# Patient Record
Sex: MALE | HISPANIC OR LATINO | Employment: FULL TIME | URBAN - METROPOLITAN AREA
[De-identification: names, ages, dates, MRNs, and addresses within clinical notes are randomized per-mention and may not be internally consistent; named-entity substitution may affect disease eponyms.]

---

## 2024-06-15 ENCOUNTER — HOSPITAL ENCOUNTER (OUTPATIENT)
Dept: RADIOLOGY | Facility: MEDICAL CENTER | Age: 59
End: 2024-06-15
Attending: STUDENT IN AN ORGANIZED HEALTH CARE EDUCATION/TRAINING PROGRAM
Payer: COMMERCIAL

## 2024-06-15 ENCOUNTER — HOSPITAL ENCOUNTER (OUTPATIENT)
Dept: LAB | Facility: MEDICAL CENTER | Age: 59
End: 2024-06-15
Attending: STUDENT IN AN ORGANIZED HEALTH CARE EDUCATION/TRAINING PROGRAM
Payer: COMMERCIAL

## 2024-06-15 ENCOUNTER — OFFICE VISIT (OUTPATIENT)
Dept: URGENT CARE | Facility: PHYSICIAN GROUP | Age: 59
End: 2024-06-15
Payer: COMMERCIAL

## 2024-06-15 ENCOUNTER — TELEPHONE (OUTPATIENT)
Dept: URGENT CARE | Facility: PHYSICIAN GROUP | Age: 59
End: 2024-06-15

## 2024-06-15 VITALS
SYSTOLIC BLOOD PRESSURE: 118 MMHG | HEIGHT: 62 IN | BODY MASS INDEX: 17.85 KG/M2 | WEIGHT: 97 LBS | TEMPERATURE: 98.3 F | RESPIRATION RATE: 20 BRPM | HEART RATE: 98 BPM | DIASTOLIC BLOOD PRESSURE: 68 MMHG | OXYGEN SATURATION: 99 %

## 2024-06-15 DIAGNOSIS — M79.672 BILATERAL FOOT PAIN: ICD-10-CM

## 2024-06-15 DIAGNOSIS — M79.671 BILATERAL FOOT PAIN: ICD-10-CM

## 2024-06-15 DIAGNOSIS — M19.071 OSTEOARTHRITIS OF RIGHT FOOT, UNSPECIFIED OSTEOARTHRITIS TYPE: ICD-10-CM

## 2024-06-15 DIAGNOSIS — R20.2 NUMBNESS AND TINGLING OF BOTH FEET: ICD-10-CM

## 2024-06-15 DIAGNOSIS — R20.0 NUMBNESS AND TINGLING OF BOTH FEET: ICD-10-CM

## 2024-06-15 DIAGNOSIS — Z86.39 HISTORY OF DIABETES MELLITUS: ICD-10-CM

## 2024-06-15 DIAGNOSIS — E11.65 UNCONTROLLED TYPE 2 DIABETES MELLITUS WITH HYPERGLYCEMIA (HCC): ICD-10-CM

## 2024-06-15 LAB
ANION GAP SERPL CALC-SCNC: 13 MMOL/L (ref 7–16)
BUN SERPL-MCNC: 10 MG/DL (ref 8–22)
CALCIUM SERPL-MCNC: 9.5 MG/DL (ref 8.5–10.5)
CHLORIDE SERPL-SCNC: 97 MMOL/L (ref 96–112)
CO2 SERPL-SCNC: 24 MMOL/L (ref 20–33)
CREAT SERPL-MCNC: 0.8 MG/DL (ref 0.5–1.4)
EST. AVERAGE GLUCOSE BLD GHB EST-MCNC: 418 MG/DL
GFR SERPLBLD CREATININE-BSD FMLA CKD-EPI: 103 ML/MIN/1.73 M 2
GLUCOSE SERPL-MCNC: 460 MG/DL (ref 65–99)
HBA1C MFR BLD: 16.2 % (ref 4–5.6)
POTASSIUM SERPL-SCNC: 3.9 MMOL/L (ref 3.6–5.5)
SODIUM SERPL-SCNC: 134 MMOL/L (ref 135–145)

## 2024-06-15 PROCEDURE — 3078F DIAST BP <80 MM HG: CPT | Performed by: STUDENT IN AN ORGANIZED HEALTH CARE EDUCATION/TRAINING PROGRAM

## 2024-06-15 PROCEDURE — 99204 OFFICE O/P NEW MOD 45 MIN: CPT | Performed by: STUDENT IN AN ORGANIZED HEALTH CARE EDUCATION/TRAINING PROGRAM

## 2024-06-15 PROCEDURE — 73630 X-RAY EXAM OF FOOT: CPT | Mod: RT

## 2024-06-15 PROCEDURE — 3074F SYST BP LT 130 MM HG: CPT | Performed by: STUDENT IN AN ORGANIZED HEALTH CARE EDUCATION/TRAINING PROGRAM

## 2024-06-15 PROCEDURE — 83036 HEMOGLOBIN GLYCOSYLATED A1C: CPT

## 2024-06-15 PROCEDURE — 73630 X-RAY EXAM OF FOOT: CPT | Mod: LT

## 2024-06-15 PROCEDURE — 36415 COLL VENOUS BLD VENIPUNCTURE: CPT

## 2024-06-15 PROCEDURE — 80048 BASIC METABOLIC PNL TOTAL CA: CPT

## 2024-06-15 NOTE — PROGRESS NOTES
"Subjective:   Singh Malik is a 58 y.o. male who presents for Foot Injury (States injured during work and feels lump on feet. States he was told to come to Urgent care to get seen. States feels dull pressure around both feet and ankles.)      HPI:  58-year-old male presents to the urgent care for approximately 3 months of bilateral foot pain.  States the pain is on the bottom of his forefeet and toes.  Has had pins-and-needles sensation and sharp poking pain intermittently.  Does have a history of type 2 diabetes and used to be on metformin but denies taking this medication.  Was switched to insulin at one point but not taking any insulin at this time.  Not currently on any diabetes medication.  Reviewed past labs.  Last A1c in the system is from 2020 and showed a value of 7.3.  He does have follow-up with his PCP in 4 days.  Denies any trauma or falls.  No redness or swelling to the lower extremities.  Not experiencing any chest pain or shortness of breath.    Medications:    Alcohol Swabs  Blood Glucose Meter Kit  Blood Glucose Test Strips  escitalopram Tabs  gabapentin Caps  Insulin Syringes U-100 0.3 mL  Lancets  metFORMIN Tabs    Allergies: Patient has no known allergies.    Problem List: Snigh Malik does not have any pertinent problems on file.    Surgical History:  No past surgical history on file.    Past Social Hx: Singh Malik  reports that he has never smoked. He has never used smokeless tobacco. He reports that he does not drink alcohol and does not use drugs.     Past Family Hx:  Singh Malik family history is not on file.     Problem list, medications, and allergies reviewed by myself today in Epic.     Objective:     /68 (BP Location: Right arm, Patient Position: Sitting, BP Cuff Size: Adult)   Pulse 98   Temp 36.8 °C (98.3 °F) (Temporal)   Resp 20   Ht 1.57 m (5' 1.81\")   Wt 44 kg (97 lb)   SpO2 99%   BMI 17.85 " kg/m²     Physical Exam  Vitals reviewed.   Constitutional:       General: He is not in acute distress.     Appearance: Normal appearance.   HENT:      Head: Normocephalic.      Right Ear: Tympanic membrane, ear canal and external ear normal.      Left Ear: Tympanic membrane, ear canal and external ear normal.      Nose: Nose normal.      Mouth/Throat:      Mouth: Mucous membranes are moist.   Eyes:      Conjunctiva/sclera: Conjunctivae normal.      Pupils: Pupils are equal, round, and reactive to light.   Cardiovascular:      Rate and Rhythm: Normal rate and regular rhythm.      Pulses: Normal pulses.      Heart sounds: Normal heart sounds. No murmur heard.  Pulmonary:      Effort: Pulmonary effort is normal. No respiratory distress.      Breath sounds: Normal breath sounds. No stridor. No wheezing, rhonchi or rales.   Musculoskeletal:      Cervical back: Normal range of motion.   Feet:      Comments: Bilateral feet: 2+ pedal pulse.  Reduced sensation primarily to the toes with touch.  Full range of motion of the toes and ankle.  No erythema, swelling, or ecchymosis.  Subjective tingling sensation to the bilateral feet.  Lymphadenopathy:      Cervical: No cervical adenopathy.   Neurological:      Mental Status: He is alert.         RADIOLOGY RESULTS   DX-FOOT-COMPLETE 3+ LEFT    Result Date: 6/15/2024  6/15/2024 10:37 AM HISTORY/REASON FOR EXAM:  Atraumatic Pain/Swelling/Deformity; 3 months of numbness and tingling in the feet.  Reports pain under the ball of the foot.  This is bilateral.  Clinically suspect peripheral neuropathy. Left foot pain TECHNIQUE/EXAM DESCRIPTION AND NUMBER OF VIEWS: 3 views of the LEFT foot. COMPARISON:  None. FINDINGS: There is no fracture. Alignment is normal. No degenerative changes are present. There is small vessel atherosclerotic plaque.     1.  No osseous abnormality 2.  Small vessel atherosclerotic plaque    DX-FOOT-COMPLETE 3+ RIGHT    Result Date: 6/15/2024  6/15/2024 10:37 AM  HISTORY/REASON FOR EXAM:  Right foot pain TECHNIQUE/EXAM DESCRIPTION AND NUMBER OF VIEWS: 3 views of the RIGHT foot. COMPARISON:  None. FINDINGS: There is no fracture. Alignment is normal. Midfoot and probably 1st metatarsophalangeal joint degenerative changes are present. There is small vessel atherosclerotic plaque.     1.  Right mid foot and probably 1st metatarsophalangeal joint osteoarthritis 2.  Small vessel atherosclerotic plaque           Assessment/Plan:     Diagnosis and associated orders:     1. Numbness and tingling of both feet  DX-FOOT-COMPLETE 3+ LEFT    DX-FOOT-COMPLETE 3+ RIGHT    HEMOGLOBIN A1C    Basic Metabolic Panel      2. Bilateral foot pain  DX-FOOT-COMPLETE 3+ LEFT    DX-FOOT-COMPLETE 3+ RIGHT      3. Uncontrolled type 2 diabetes mellitus with hyperglycemia (HCC)  HEMOGLOBIN A1C    Basic Metabolic Panel    metFORMIN (GLUCOPHAGE) 500 MG Tab      4. Osteoarthritis of right foot, unspecified osteoarthritis type           Comments/MDM:     Right foot shows arthritic changes and small vessel atherosclerotic plaque.  Left foot shows arthrosclerotic plaque.  No fracture or acute osseous abnormality.  Given patient's description of his symptoms, I have high suspicion for peripheral neuropathy.  Not currently on any diabetes medications and has been diagnosed with type 2 diabetes in the past.  3-month progression.  I will order new A1c and metabolic panel for further evaluation.  Patient vies to follow-up with his PCP on his scheduled appointment in 4 days.  Labs blood glucose level of 460.  A1c 16.2.  Normal GFR and no GOLDEN.  Patient be restarted on metformin at this time.  Advised to follow-up with PCP as he may need additional medications given the significance of his A1c.  Patient was contacted over the phone with his test results.         Differential diagnosis, natural history, supportive care, and indications for immediate follow-up discussed.    Advised the patient to follow-up with the  primary care physician for recheck, reevaluation, and consideration of further management.    Please note that this dictation was created using voice recognition software. I have made a reasonable attempt to correct obvious errors, but I expect that there are errors of grammar and possibly content that I did not discover before finalizing the note.    Electronically signed by Hugo Pop PA-C.

## 2024-06-15 NOTE — ADDENDUM NOTE
Addended by: CHANELL GRAJEDA on: 6/15/2024 04:27 PM     Modules accepted: Orders    
No Vaccines Administered.

## 2024-06-16 NOTE — TELEPHONE ENCOUNTER
Phone Number Called: [phone]       Call outcome: Spoke to patient regarding message below.    Message: Called and spoke to patient sister stated that they have an appt coming up on Monday with primary and that they will  medicine shortly from pharmacy also let her know that patient A1c is high

## 2024-06-18 ENCOUNTER — OFFICE VISIT (OUTPATIENT)
Dept: MEDICAL GROUP | Facility: PHYSICIAN GROUP | Age: 59
End: 2024-06-18
Payer: COMMERCIAL

## 2024-06-18 VITALS
SYSTOLIC BLOOD PRESSURE: 108 MMHG | WEIGHT: 97.3 LBS | DIASTOLIC BLOOD PRESSURE: 60 MMHG | RESPIRATION RATE: 20 BRPM | OXYGEN SATURATION: 99 % | BODY MASS INDEX: 17.9 KG/M2 | HEIGHT: 62 IN | TEMPERATURE: 98.6 F | HEART RATE: 98 BPM

## 2024-06-18 DIAGNOSIS — Z13.228 SCREENING FOR ENDOCRINE, METABOLIC AND IMMUNITY DISORDER: ICD-10-CM

## 2024-06-18 DIAGNOSIS — Z11.3 SCREENING EXAMINATION FOR SEXUALLY TRANSMITTED DISEASE: ICD-10-CM

## 2024-06-18 DIAGNOSIS — R20.2 TINGLING IN EXTREMITIES: ICD-10-CM

## 2024-06-18 DIAGNOSIS — Z13.6 SCREENING FOR CARDIOVASCULAR CONDITION: ICD-10-CM

## 2024-06-18 DIAGNOSIS — R79.89 ABNORMAL LFTS: ICD-10-CM

## 2024-06-18 DIAGNOSIS — F41.9 ANXIETY: ICD-10-CM

## 2024-06-18 DIAGNOSIS — E11.65 TYPE 2 DIABETES MELLITUS WITH HYPERGLYCEMIA, WITHOUT LONG-TERM CURRENT USE OF INSULIN (HCC): ICD-10-CM

## 2024-06-18 DIAGNOSIS — Z13.0 SCREENING FOR ENDOCRINE, METABOLIC AND IMMUNITY DISORDER: ICD-10-CM

## 2024-06-18 DIAGNOSIS — Z13.29 SCREENING FOR ENDOCRINE, METABOLIC AND IMMUNITY DISORDER: ICD-10-CM

## 2024-06-18 DIAGNOSIS — D75.839 THROMBOCYTOSIS: ICD-10-CM

## 2024-06-18 DIAGNOSIS — E55.9 VITAMIN D DEFICIENCY: ICD-10-CM

## 2024-06-18 DIAGNOSIS — Z12.11 COLON CANCER SCREENING: ICD-10-CM

## 2024-06-18 DIAGNOSIS — M79.2 NEUROPATHIC PAIN: ICD-10-CM

## 2024-06-18 PROCEDURE — 99214 OFFICE O/P EST MOD 30 MIN: CPT

## 2024-06-18 PROCEDURE — 3078F DIAST BP <80 MM HG: CPT

## 2024-06-18 PROCEDURE — 3074F SYST BP LT 130 MM HG: CPT

## 2024-06-18 RX ORDER — GLUCOSAMINE HCL/CHONDROITIN SU 500-400 MG
CAPSULE ORAL
Qty: 100 EACH | Refills: 0 | Status: SHIPPED | OUTPATIENT
Start: 2024-06-18

## 2024-06-18 RX ORDER — LANCETS 30 GAUGE
EACH MISCELLANEOUS
Qty: 100 EACH | Refills: 0 | Status: SHIPPED | OUTPATIENT
Start: 2024-06-18

## 2024-06-18 RX ORDER — GABAPENTIN 100 MG/1
100 CAPSULE ORAL 3 TIMES DAILY
Qty: 90 CAPSULE | Refills: 0 | Status: SHIPPED | OUTPATIENT
Start: 2024-06-18

## 2024-06-18 ASSESSMENT — ENCOUNTER SYMPTOMS
COUGH: 0
BLOOD IN STOOL: 0
CHILLS: 0
DIARRHEA: 0
VOMITING: 0
TINGLING: 0
SHORTNESS OF BREATH: 0
PALPITATIONS: 0
HEADACHES: 0
DIZZINESS: 0
WHEEZING: 0
CONSTIPATION: 0
WEIGHT LOSS: 0
ABDOMINAL PAIN: 0
FEVER: 0
NAUSEA: 0

## 2024-06-18 NOTE — PROGRESS NOTES
Subjective:     Chief Complaint   Patient presents with    Establish Care    Medication Refill     Gabapentin     Pain     Vitaly feet pain x 3 month      Interpretor Name: Valentín Maynard Haranny (multiple disconnections due to Internet connection.)  Interpretor ID: [id number]  Patient preferred language used: Yakut    Singh Malik is a 58 y.o. male, who is here today to establish care and discuss pain.     Problem   Anxiety    History of anxiety, patient was on Lexapro 10 mg.  Patient has not been on this medication since 2020.     Type 2 Diabetes Mellitus With Hyperglycemia, Without Long-Term Current Use of Insulin (Hcc)    Current medications:  Metformin 500 mg twice daily.  Patient states that he has not been taking this as he was unable to pick this up in the last week when he was prescribed it.    A1c: 16.2% (6/2024), 7.3% (2020)  Microalb/Cr ratio: none, due  Fasting sugars: Does not check  Diabetic foot exam: none, due  Retinal eye exam: none  ACEi/ARB: none, due for labs  Statin: none, due for labs  Aspirin: N/A  Concomitant HTN: None    *Does note some odd urinary symptoms at this visit stating that it is almost foamy when he urinates.     Neuropathic Pain    Patient complains of 3 months of bilateral foot pain.  States the pain is on the bottom of his forefeet and toes.  Has had pins-and-needles sensation and sharp poking pain intermittently.  Reports he does have a history of type 2 diabetes and was on metformin but denies taking this medication.  Patient was switched to insulin at one point but not taking any insulin at this time.  Was placed on metformin with recent urgent care visit in the last week.    Recent hemoglobin A1c from urgent care showed 16.2%  Has a history of a lumbar x-ray back in 2013 showing spondylolisthesis as well as degenerative disc disease.  NO repeat imaging has been done as of late.  Denies any trauma or falls.  No redness or swelling to the lower  "extremities.  Not experiencing any chest pain or shortness of breath.         Allergies: Patient has no known allergies.    Current Outpatient Medications:     gabapentin (NEURONTIN) 100 MG Cap, Take 1 Capsule by mouth 3 times a day., Disp: 90 Capsule, Rfl: 0    Blood Glucose Meter Kit, Test blood sugar as recommended by provider.  Per insurance blood glucose monitoring kit., Disp: 1 Kit, Rfl: 0    Blood Glucose Test Strips, Use one per insurance strip to test blood sugar once daily early morning before first meal., Disp: 100 Strip, Rfl: 0    Lancets, Use one per insurance lancet to test blood sugar once daily early morning before first meal., Disp: 100 Each, Rfl: 0    Alcohol Swabs, Wipe site with prep pad prior to injection., Disp: 100 Each, Rfl: 0    metFORMIN (GLUCOPHAGE) 500 MG Tab, Take 1 Tablet by mouth 2 times a day with meals. (Patient not taking: Reported on 6/18/2024), Disp: 60 Tablet, Rfl: 0    escitalopram (LEXAPRO) 10 MG Tab, Take 1 Tab by mouth every day. (Patient not taking: Reported on 6/15/2024), Disp: 30 Tab, Rfl: 0    Insulin Syringes U-100 0.3 mL, Use one syringe to inject insulin once daily. (Patient not taking: Reported on 6/15/2024), Disp: 100 Each, Rfl: 0     Review of Systems   Constitutional:  Negative for chills, fever and weight loss.   Respiratory:  Negative for cough, shortness of breath and wheezing.    Cardiovascular:  Negative for chest pain, palpitations and leg swelling.   Gastrointestinal:  Negative for abdominal pain, blood in stool, constipation, diarrhea, nausea and vomiting.   Genitourinary:  Negative for hematuria.   Skin:  Negative for itching and rash.   Neurological:  Negative for dizziness, tingling and headaches.     Health Maintenance: Completed    Objective:     /60 (BP Location: Left arm, Patient Position: Sitting, BP Cuff Size: Adult)   Pulse 98   Temp 37 °C (98.6 °F) (Temporal)   Resp 20   Ht 1.57 m (5' 1.81\")   Wt 44.1 kg (97 lb 4.8 oz)   SpO2 99%  " Body mass index is 17.91 kg/m².    Physical Exam  Vitals reviewed.   Constitutional:       General: He is not in acute distress.     Appearance: Normal appearance. He is not ill-appearing.   Cardiovascular:      Rate and Rhythm: Normal rate and regular rhythm.      Heart sounds: Normal heart sounds.   Pulmonary:      Effort: Pulmonary effort is normal.      Breath sounds: Normal breath sounds.   Abdominal:      General: Abdomen is flat.      Palpations: Abdomen is soft.   Musculoskeletal:         General: Normal range of motion.      Cervical back: Normal range of motion.   Skin:     General: Skin is warm and dry.   Neurological:      General: No focal deficit present.      Mental Status: He is alert.   Psychiatric:         Mood and Affect: Mood normal.         Behavior: Behavior normal.         Thought Content: Thought content normal.         Judgment: Judgment normal.        Assessment & Plan:     The following plan was discussed through shared decision making with the patient:    1. Type 2 diabetes mellitus with hyperglycemia, without long-term current use of insulin (HCC)  Chronic, uncontrolled.  Reviewed patient's dietary intake with focus on reduction or elimination of simple carbohydrates, encouraged patient to increase fiber intake to at least 25 grams per day as tolerated. Aerobic and anaerobic exercise is recommended, aim for 10 minutes daily five days per week and increase to 30 minutes daily as tolerated.   Advised patient to begin taking his metformin 500 mg twice daily and to start checking his morning fasting glucoses.  Patient will record these and follow-up in office in 3 to 4 weeks to go over these glucoses as well as his symptoms on his metformin.    Patient will get updated blood work prior to this follow-up appointment.  Unable to get monofilament due to time in office will complete this at his follow-up visit.  As well as his retina scan.  Patient advised to present to ER for severe fatigue,  confusion, weakness, rapid heard rate.   - Comp Metabolic Panel; Future  - MICROALBUMIN CREAT RATIO URINE; Future  - Blood Glucose Meter Kit; Test blood sugar as recommended by provider.  Per insurance blood glucose monitoring kit.  Dispense: 1 Kit; Refill: 0  - Blood Glucose Test Strips; Use one per insurance strip to test blood sugar once daily early morning before first meal.  Dispense: 100 Strip; Refill: 0  - Lancets; Use one per insurance lancet to test blood sugar once daily early morning before first meal.  Dispense: 100 Each; Refill: 0  - Alcohol Swabs; Wipe site with prep pad prior to injection.  Dispense: 100 Each; Refill: 0    2. Neuropathic pain  3. Tingling in extremities  Chronic, Uncontrolled.  Due to old x-ray in 2013 showing spondylolisthesis we will get an updated image of his lower spine and have patient restart on gabapentin to help with this neuropathy type pain.  Patient will follow-up with me in office in 3 to 4 weeks to discuss this nerve pain.  Educated patient on diabetic complications including neuropathy.  May consider pain referral in the future for this pain.  - DX-LUMBAR SPINE-2 OR 3 VIEWS; Future  - gabapentin (NEURONTIN) 100 MG Cap; Take 1 Capsule by mouth 3 times a day.  Dispense: 90 Capsule; Refill: 0    4. Abnormal LFTs  Of elevated LFTs will get updated blood work  - Comp Metabolic Panel; Future    5. Thrombocytosis  History of thrombocytosis we will get updated blood work  - CBC WITH DIFFERENTIAL; Future    6. Anxiety  History of anxiety, advised patient if this worsens and he would like to restart medication we can discuss this at her next follow-up appointment.  NO symptoms /no concerns in office today    7. Screening for endocrine, metabolic and immunity disorder  Patient establishing care today in office; due for updated lab work/screenings.  Will follow-up in [facility] with lab results.  Discussed with patient that if there is a significant amount of lab abnormalities we will  have a follow-up appointment and I will indicate this is in my follow-up message with lab results.  Patient stated verbal understanding.  - TSH WITH REFLEX TO FT4; Future    8. Screening for cardiovascular condition  See #7  - Lipid Profile; Future    9. Vitamin D deficiency  See #7  - VITAMIN D,25 HYDROXY (DEFICIENCY); Future    10. Screening examination for sexually transmitted disease  See #7  - HIV AG/AB COMBO ASSAY SCREENING; Future    11. Colon cancer screening  See #7  - COLOGUARD (FIT DNA)    Return in about 4 weeks (around 7/16/2024) for Diabetes.         Please note that this dictation was created using voice recognition software. I have made every reasonable attempt to correct obvious errors, but I expect that there are errors of grammar and possibly content that I did not discover before finalizing the note.    MAIA Kyle  [facility]  [facility]

## 2024-06-18 NOTE — LETTER
[facility]  ROMAN Kyle  [address]  [place] [address]  Fax: [phone]   Authorization for Release/Disclosure of   Protected Health Information   Name: SINGH RANDALL : 1965 SSN: [id number]   Address: [address] [place] [address] [place] [address] Phone:    There are no phone numbers on file.   I authorize the entity listed below to release/disclose the PHI below to:   [facility]/ROMAN Kyle and ROMAN Kyle   Provider or Entity Name:     Address   City, State, Zip   Phone:      Fax:     Reason for request: continuity of care   Information to be released:    [  ] LAST COLONOSCOPY,  including any PATH REPORT and follow-up  [  ] LAST FIT/COLOGUARD RESULT [  ] LAST DEXA  [  ] LAST MAMMOGRAM  [  ] LAST PAP  [  ] LAST LABS [  ] RETINA EXAM REPORT  [  ] IMMUNIZATION RECORDS  [  ] Release all info      [  ] Check here and initial the line next to each item to release ALL health information INCLUDING  _____ Care and treatment for drug and / or alcohol abuse  _____ HIV testing, infection status, or AIDS  _____ Genetic Testing    DATES OF SERVICE OR TIME PERIOD TO BE DISCLOSED: _____________  I understand and acknowledge that:  * This Authorization may be revoked at any time by you in writing, except if your health information has already been used or disclosed.  * Your health information that will be used or disclosed as a result of you signing this authorization could be re-disclosed by the recipient. If this occurs, your re-disclosed health information may no longer be protected by State or Federal laws.  * You may refuse to sign this Authorization. Your refusal will not affect your ability to obtain treatment.  * This Authorization becomes effective upon signing and will  on (date) __________.      If no date is indicated, this Authorization will  one (1) year from the signature date.    Name: Singh Mejia  [place]  Signature: Date:   6/18/2024     PLEASE FAX REQUESTED RECORDS BACK TO: [phone]

## 2024-06-29 ENCOUNTER — HOSPITAL ENCOUNTER (OUTPATIENT)
Dept: RADIOLOGY | Facility: MEDICAL CENTER | Age: 59
End: 2024-06-29
Payer: COMMERCIAL

## 2024-06-29 DIAGNOSIS — R20.2 TINGLING IN EXTREMITIES: ICD-10-CM

## 2024-06-29 DIAGNOSIS — M79.2 NEUROPATHIC PAIN: ICD-10-CM

## 2024-06-29 PROCEDURE — 72100 X-RAY EXAM L-S SPINE 2/3 VWS: CPT

## 2024-07-02 DIAGNOSIS — M79.2 NEUROPATHIC PAIN: ICD-10-CM

## 2024-07-02 DIAGNOSIS — M51.36 DEGENERATIVE DISC DISEASE, LUMBAR: ICD-10-CM

## 2024-07-02 DIAGNOSIS — R20.2 TINGLING IN EXTREMITIES: ICD-10-CM

## 2024-07-19 ENCOUNTER — HOSPITAL ENCOUNTER (OUTPATIENT)
Dept: LAB | Facility: MEDICAL CENTER | Age: 59
End: 2024-07-19
Payer: COMMERCIAL

## 2024-07-19 DIAGNOSIS — Z13.0 SCREENING FOR ENDOCRINE, METABOLIC AND IMMUNITY DISORDER: ICD-10-CM

## 2024-07-19 DIAGNOSIS — Z13.29 SCREENING FOR ENDOCRINE, METABOLIC AND IMMUNITY DISORDER: ICD-10-CM

## 2024-07-19 DIAGNOSIS — E11.65 TYPE 2 DIABETES MELLITUS WITH HYPERGLYCEMIA, WITHOUT LONG-TERM CURRENT USE OF INSULIN (HCC): ICD-10-CM

## 2024-07-19 DIAGNOSIS — Z13.6 SCREENING FOR CARDIOVASCULAR CONDITION: ICD-10-CM

## 2024-07-19 DIAGNOSIS — E55.9 VITAMIN D DEFICIENCY: ICD-10-CM

## 2024-07-19 DIAGNOSIS — D75.839 THROMBOCYTOSIS: ICD-10-CM

## 2024-07-19 DIAGNOSIS — Z13.228 SCREENING FOR ENDOCRINE, METABOLIC AND IMMUNITY DISORDER: ICD-10-CM

## 2024-07-19 DIAGNOSIS — Z11.3 SCREENING EXAMINATION FOR SEXUALLY TRANSMITTED DISEASE: ICD-10-CM

## 2024-07-19 DIAGNOSIS — R79.89 ABNORMAL LFTS: ICD-10-CM

## 2024-07-19 LAB
25(OH)D3 SERPL-MCNC: 51 NG/ML (ref 30–100)
ALBUMIN SERPL BCP-MCNC: 4.1 G/DL (ref 3.2–4.9)
ALBUMIN/GLOB SERPL: 1.5 G/DL
ALP SERPL-CCNC: 99 U/L (ref 30–99)
ALT SERPL-CCNC: 13 U/L (ref 2–50)
ANION GAP SERPL CALC-SCNC: 14 MMOL/L (ref 7–16)
AST SERPL-CCNC: 16 U/L (ref 12–45)
BASOPHILS # BLD AUTO: 0.9 % (ref 0–1.8)
BASOPHILS # BLD: 0.07 K/UL (ref 0–0.12)
BILIRUB SERPL-MCNC: 0.4 MG/DL (ref 0.1–1.5)
BUN SERPL-MCNC: 8 MG/DL (ref 8–22)
CALCIUM ALBUM COR SERPL-MCNC: 9.5 MG/DL (ref 8.5–10.5)
CALCIUM SERPL-MCNC: 9.6 MG/DL (ref 8.5–10.5)
CHLORIDE SERPL-SCNC: 98 MMOL/L (ref 96–112)
CHOLEST SERPL-MCNC: 186 MG/DL (ref 100–199)
CO2 SERPL-SCNC: 25 MMOL/L (ref 20–33)
CREAT SERPL-MCNC: 0.66 MG/DL (ref 0.5–1.4)
CREAT UR-MCNC: 64.27 MG/DL
EOSINOPHIL # BLD AUTO: 0.23 K/UL (ref 0–0.51)
EOSINOPHIL NFR BLD: 2.8 % (ref 0–6.9)
ERYTHROCYTE [DISTWIDTH] IN BLOOD BY AUTOMATED COUNT: 39 FL (ref 35.9–50)
GFR SERPLBLD CREATININE-BSD FMLA CKD-EPI: 110 ML/MIN/1.73 M 2
GLOBULIN SER CALC-MCNC: 2.8 G/DL (ref 1.9–3.5)
GLUCOSE SERPL-MCNC: 237 MG/DL (ref 65–99)
HCT VFR BLD AUTO: 43.9 % (ref 42–52)
HDLC SERPL-MCNC: 48 MG/DL
HGB BLD-MCNC: 15.8 G/DL (ref 14–18)
HIV 1+2 AB+HIV1 P24 AG SERPL QL IA: NORMAL
IMM GRANULOCYTES # BLD AUTO: 0.02 K/UL (ref 0–0.11)
IMM GRANULOCYTES NFR BLD AUTO: 0.2 % (ref 0–0.9)
LDLC SERPL CALC-MCNC: 116 MG/DL
LYMPHOCYTES # BLD AUTO: 2.41 K/UL (ref 1–4.8)
LYMPHOCYTES NFR BLD: 29.4 % (ref 22–41)
MCH RBC QN AUTO: 31.5 PG (ref 27–33)
MCHC RBC AUTO-ENTMCNC: 36 G/DL (ref 32.3–36.5)
MCV RBC AUTO: 87.5 FL (ref 81.4–97.8)
MICROALBUMIN UR-MCNC: <1.2 MG/DL
MICROALBUMIN/CREAT UR: NORMAL MG/G (ref 0–30)
MONOCYTES # BLD AUTO: 0.74 K/UL (ref 0–0.85)
MONOCYTES NFR BLD AUTO: 9 % (ref 0–13.4)
NEUTROPHILS # BLD AUTO: 4.73 K/UL (ref 1.82–7.42)
NEUTROPHILS NFR BLD: 57.7 % (ref 44–72)
NRBC # BLD AUTO: 0 K/UL
NRBC BLD-RTO: 0 /100 WBC (ref 0–0.2)
PLATELET # BLD AUTO: 315 K/UL (ref 164–446)
PMV BLD AUTO: 8.9 FL (ref 9–12.9)
POTASSIUM SERPL-SCNC: 4.3 MMOL/L (ref 3.6–5.5)
PROT SERPL-MCNC: 6.9 G/DL (ref 6–8.2)
RBC # BLD AUTO: 5.02 M/UL (ref 4.7–6.1)
SODIUM SERPL-SCNC: 137 MMOL/L (ref 135–145)
TRIGL SERPL-MCNC: 108 MG/DL (ref 0–149)
TSH SERPL DL<=0.005 MIU/L-ACNC: 3.59 UIU/ML (ref 0.38–5.33)
WBC # BLD AUTO: 8.2 K/UL (ref 4.8–10.8)

## 2024-07-19 PROCEDURE — 82043 UR ALBUMIN QUANTITATIVE: CPT

## 2024-07-19 PROCEDURE — 80053 COMPREHEN METABOLIC PANEL: CPT

## 2024-07-19 PROCEDURE — 36415 COLL VENOUS BLD VENIPUNCTURE: CPT

## 2024-07-19 PROCEDURE — 84443 ASSAY THYROID STIM HORMONE: CPT

## 2024-07-19 PROCEDURE — 85025 COMPLETE CBC W/AUTO DIFF WBC: CPT

## 2024-07-19 PROCEDURE — 87389 HIV-1 AG W/HIV-1&-2 AB AG IA: CPT

## 2024-07-19 PROCEDURE — 82306 VITAMIN D 25 HYDROXY: CPT

## 2024-07-19 PROCEDURE — 80061 LIPID PANEL: CPT

## 2024-07-19 PROCEDURE — 82570 ASSAY OF URINE CREATININE: CPT

## 2024-07-23 ENCOUNTER — APPOINTMENT (OUTPATIENT)
Dept: MEDICAL GROUP | Facility: PHYSICIAN GROUP | Age: 59
End: 2024-07-23
Payer: COMMERCIAL

## 2024-07-26 ENCOUNTER — APPOINTMENT (OUTPATIENT)
Dept: MEDICAL GROUP | Facility: PHYSICIAN GROUP | Age: 59
End: 2024-07-26
Payer: COMMERCIAL

## 2024-07-26 DIAGNOSIS — E11.65 UNCONTROLLED TYPE 2 DIABETES MELLITUS WITH HYPERGLYCEMIA (HCC): ICD-10-CM

## 2024-07-26 DIAGNOSIS — M79.2 NEUROPATHIC PAIN: ICD-10-CM

## 2024-07-26 DIAGNOSIS — R20.2 TINGLING IN EXTREMITIES: ICD-10-CM

## 2024-07-29 RX ORDER — GABAPENTIN 100 MG/1
100 CAPSULE ORAL 3 TIMES DAILY
Qty: 90 CAPSULE | Refills: 0 | Status: SHIPPED | OUTPATIENT
Start: 2024-07-29

## 2024-08-06 ENCOUNTER — OFFICE VISIT (OUTPATIENT)
Dept: MEDICAL GROUP | Facility: PHYSICIAN GROUP | Age: 59
End: 2024-08-06
Payer: COMMERCIAL

## 2024-08-06 VITALS
BODY MASS INDEX: 16.93 KG/M2 | TEMPERATURE: 98.2 F | HEIGHT: 62 IN | DIASTOLIC BLOOD PRESSURE: 68 MMHG | SYSTOLIC BLOOD PRESSURE: 110 MMHG | WEIGHT: 92 LBS | RESPIRATION RATE: 12 BRPM | HEART RATE: 113 BPM | OXYGEN SATURATION: 98 %

## 2024-08-06 DIAGNOSIS — M79.2 NEUROPATHIC PAIN: ICD-10-CM

## 2024-08-06 DIAGNOSIS — Z02.89 ENCOUNTER FOR COMPLETION OF FORM WITH PATIENT: ICD-10-CM

## 2024-08-06 DIAGNOSIS — M43.16 ANTEROLISTHESIS OF LUMBAR SPINE: ICD-10-CM

## 2024-08-06 DIAGNOSIS — M51.36 DEGENERATIVE DISC DISEASE, LUMBAR: ICD-10-CM

## 2024-08-06 DIAGNOSIS — R20.2 TINGLING IN EXTREMITIES: ICD-10-CM

## 2024-08-06 DIAGNOSIS — E11.65 TYPE 2 DIABETES MELLITUS WITH HYPERGLYCEMIA, WITHOUT LONG-TERM CURRENT USE OF INSULIN (HCC): ICD-10-CM

## 2024-08-06 PROBLEM — M51.369 DEGENERATIVE DISC DISEASE, LUMBAR: Status: ACTIVE | Noted: 2024-08-06

## 2024-08-06 PROCEDURE — 99214 OFFICE O/P EST MOD 30 MIN: CPT

## 2024-08-06 PROCEDURE — 3078F DIAST BP <80 MM HG: CPT

## 2024-08-06 PROCEDURE — 3074F SYST BP LT 130 MM HG: CPT

## 2024-08-06 RX ORDER — MELOXICAM 15 MG/1
15 TABLET ORAL DAILY
Qty: 30 TABLET | Refills: 1 | Status: SHIPPED | OUTPATIENT
Start: 2024-08-06

## 2024-08-06 RX ORDER — GABAPENTIN 100 MG/1
200 CAPSULE ORAL 3 TIMES DAILY
Qty: 180 CAPSULE | Refills: 0 | Status: SHIPPED
Start: 2024-08-06 | End: 2024-08-20

## 2024-08-06 ASSESSMENT — FIBROSIS 4 INDEX: FIB4 SCORE: 0.79

## 2024-08-06 NOTE — LETTER
August 6, 2024    To Whom It May Concern:         This is confirmation that Singh Malik attended his scheduled appointment with ROMAN Kyle on 8/06/24.     Patient is currently under my care and managing medical issues. Please allow this patient to take a total of 15 days to rest and attend doctor's visits. I am sending him to specialty care for further evaluation as well.          If you have any questions please do not hesitate to call me at the phone number listed below.    Sincerely,          PRASHANT Kyle.  [phone]

## 2024-08-06 NOTE — PROGRESS NOTES
Subjective:     Chief Complaint   Patient presents with    Lab Results    Foot Problem     Wants a disability form   Has arthritis and pain in both of his feet   Left big toe has the most pain     Diabetes    Leg Pain     Leg pain in both legs.    Interpretor Name: Megan  Interpretor ID: [id number]  Patient preferred language used: Japanese    HPI: Singh presents today with  Problem   Degenerative Disc Disease, Lumbar   Anterolisthesis of Lumbar Spine   Type 2 Diabetes Mellitus With Hyperglycemia, Without Long-Term Current Use of Insulin (Hcc)    Current medications:  Metformin 500 mg twice daily.      A1c: 16.2% (6/2024), 7.3% (2020)  Microalb/Cr ratio: none, due  Fasting sugars: Does not check  Diabetic foot exam: none, due  Retinal eye exam: none  ACEi/ARB: none, due for labs  Statin: none, due for labs  Aspirin: N/A  Concomitant HTN: None    *Does note some odd urinary symptoms at this visit stating that it is almost foamy when he urinates.     Neuropathic Pain    Patient is today for follow-up of neuropathic pain which he has been complaining for worsening of bilateral foot pain over the last 3 to 5 months.   States the pain is on the bottom of his forefeet and toes.  Has had pins-and-needles sensation and sharp poking pain intermittently.  Reports he does have a history of type 2 diabetes and was on metformin but denies taking this medication.  Patient was switched to insulin at one point but not taking any insulin at this time.  Was placed on metformin with recent urgent care visit in the last week.    Recent hemoglobin A1c from urgent care showed 16.2%  Recent lumbar x-ray (6/2024) revealed degenerative disc disease as well as anterolisthesis of the lumbar spine.  Denies any trauma or falls.  No redness or swelling to the lower extremities.      Allergies: Patient has no known allergies.  ROS per HPI  Health Maintenance: Completed   Objective:     /68 (BP Location: Left arm, Patient Position: Sitting, BP  "Cuff Size: Small adult)   Pulse (!) 113   Temp 36.8 °C (98.2 °F) (Temporal)   Resp 12   Ht 1.57 m (5' 1.81\")   Wt 41.7 kg (92 lb)   SpO2 98%   BMI 16.93 kg/m²  Body mass index is 16.93 kg/m².     Physical Exam  Vitals reviewed.   Constitutional:       General: He is not in acute distress.     Appearance: Normal appearance. He is not ill-appearing.   Cardiovascular:      Rate and Rhythm: Normal rate and regular rhythm.   Pulmonary:      Effort: Pulmonary effort is normal. No respiratory distress.   Musculoskeletal:      Right foot: Normal range of motion. No tenderness or bony tenderness.      Left foot: Normal range of motion. No tenderness or bony tenderness.      Comments: Decreased sensation to his feet bilaterally   Skin:     Coloration: Skin is not jaundiced or pale.   Neurological:      General: No focal deficit present.      Mental Status: He is alert and oriented to person, place, and time.   Psychiatric:         Mood and Affect: Mood normal.         Behavior: Behavior normal.         Thought Content: Thought content normal.         Judgment: Judgment normal.       Results for orders placed or performed during the hospital encounter of 07/19/24   MICROALBUMIN CREAT RATIO URINE   Result Value Ref Range    Creatinine, Urine 64.27 mg/dL    Microalbumin, Urine Random <1.2 mg/dL    Micro Alb Creat Ratio see below 0 - 30 mg/g   Comp Metabolic Panel   Result Value Ref Range    Sodium 137 135 - 145 mmol/L    Potassium 4.3 3.6 - 5.5 mmol/L    Chloride 98 96 - 112 mmol/L    Co2 25 20 - 33 mmol/L    Anion Gap 14.0 7.0 - 16.0    Glucose 237 (H) 65 - 99 mg/dL    Bun 8 8 - 22 mg/dL    Creatinine 0.66 0.50 - 1.40 mg/dL    Calcium 9.6 8.5 - 10.5 mg/dL    Correct Calcium 9.5 8.5 - 10.5 mg/dL    AST(SGOT) 16 12 - 45 U/L    ALT(SGPT) 13 2 - 50 U/L    Alkaline Phosphatase 99 30 - 99 U/L    Total Bilirubin 0.4 0.1 - 1.5 mg/dL    Albumin 4.1 3.2 - 4.9 g/dL    Total Protein 6.9 6.0 - 8.2 g/dL    Globulin 2.8 1.9 - 3.5 g/dL "    A-G Ratio 1.5 g/dL   HIV AG/AB COMBO ASSAY SCREENING   Result Value Ref Range    HIV Ag/Ab Combo Assay Non-Reactive Non Reactive   TSH WITH REFLEX TO FT4   Result Value Ref Range    TSH 3.590 0.380 - 5.330 uIU/mL   VITAMIN D,25 HYDROXY (DEFICIENCY)   Result Value Ref Range    25-Hydroxy   Vitamin D 25 51 30 - 100 ng/mL   Lipid Profile   Result Value Ref Range    Cholesterol,Tot 186 100 - 199 mg/dL    Triglycerides 108 0 - 149 mg/dL    HDL 48 >=40 mg/dL     (H) <100 mg/dL   CBC WITH DIFFERENTIAL   Result Value Ref Range    WBC 8.2 4.8 - 10.8 K/uL    RBC 5.02 4.70 - 6.10 M/uL    Hemoglobin 15.8 14.0 - 18.0 g/dL    Hematocrit 43.9 42.0 - 52.0 %    MCV 87.5 81.4 - 97.8 fL    MCH 31.5 27.0 - 33.0 pg    MCHC 36.0 32.3 - 36.5 g/dL    RDW 39.0 35.9 - 50.0 fL    Platelet Count 315 164 - 446 K/uL    MPV 8.9 (L) 9.0 - 12.9 fL    Neutrophils-Polys 57.70 44.00 - 72.00 %    Lymphocytes 29.40 22.00 - 41.00 %    Monocytes 9.00 0.00 - 13.40 %    Eosinophils 2.80 0.00 - 6.90 %    Basophils 0.90 0.00 - 1.80 %    Immature Granulocytes 0.20 0.00 - 0.90 %    Nucleated RBC 0.00 0.00 - 0.20 /100 WBC    Neutrophils (Absolute) 4.73 1.82 - 7.42 K/uL    Lymphs (Absolute) 2.41 1.00 - 4.80 K/uL    Monos (Absolute) 0.74 0.00 - 0.85 K/uL    Eos (Absolute) 0.23 0.00 - 0.51 K/uL    Baso (Absolute) 0.07 0.00 - 0.12 K/uL    Immature Granulocytes (abs) 0.02 0.00 - 0.11 K/uL    NRBC (Absolute) 0.00 K/uL   ESTIMATED GFR   Result Value Ref Range    GFR (CKD-EPI) 110 >60 mL/min/1.73 m 2      Assessment and Plan:     The following treatment plan was discussed through shared decision making with the patient:    1. Type 2 diabetes mellitus with hyperglycemia, without long-term current use of insulin (HCC)  Chronic, uncontrolled.  Patient's blood sugar log from home that he brought in office today still significantly elevated.  We will increase his metformin to 1000 mg twice daily and follow-up in the little over a month to get an A1c in office as  well as other diabetic annual screenings.  - metformin (GLUCOPHAGE) 1000 MG tablet; Take 1 Tablet by mouth 2 times a day with meals.  Dispense: 60 Tablet; Refill: 2    2. Degenerative disc disease, lumbar  3. Anterolisthesis of lumbar spine  Chronic, uncontrolled.  Patient will begin taking meloxicam 15 mg daily to help with pain relief.  Discussed that patient should not take this with any other NSAID therapy and to take this with food.  Referral was previously placed after x-ray was completed for pain management.  Provided phone number to call and schedule this appointment to further evaluate with physiatry/pain management.  Advised patient he may also take Tylenol and to not exceed 3 g daily.  - meloxicam (MOBIC) 15 MG tablet; Take 1 Tablet by mouth every day.  Dispense: 30 Tablet; Refill: 1    4. Neuropathic pain  5. Tingling in extremities  Chronic, uncontrolled.  Patient did notice an improvement while on gabapentin however still struggling with some breakthrough neuropathy.  We will dose increase his gabapentin to 200 mg 3 times daily.  Patient will follow-up with physiatry/pain management for further management of his neuropathy and degenerative disc disease  - gabapentin (NEURONTIN) 100 MG Cap; Take 2 Capsules by mouth 3 times a day for 30 days.  Dispense: 180 Capsule; Refill: 0    6. Encounter for completion of form with patient  Letter completed for patient.  Discussed FMLA paperwork with him and his family member for further care as this will likely not be resolved within the 15 days he was requesting today in office.  Patient stated verbal understanding    Return in about 6 weeks (around 9/17/2024) for DM w/ A1C.         Please note that this note was created using dictation with voice recognition software. I have made every reasonable attempt to correct obvious errors, but I expect that there are errors of grammar and possibly content that I did not discover before finalizing the note.    Maria T CLARK  MAIA Lay  [facility]  [facility]

## 2024-08-07 ENCOUNTER — TELEPHONE (OUTPATIENT)
Dept: HEALTH INFORMATION MANAGEMENT | Facility: OTHER | Age: 59
End: 2024-08-07
Payer: COMMERCIAL

## 2024-08-13 ENCOUNTER — TELEPHONE (OUTPATIENT)
Dept: HEALTH INFORMATION MANAGEMENT | Facility: OTHER | Age: 59
End: 2024-08-13
Payer: COMMERCIAL

## 2024-08-13 DIAGNOSIS — E11.65 TYPE 2 DIABETES MELLITUS WITH HYPERGLYCEMIA, WITHOUT LONG-TERM CURRENT USE OF INSULIN (HCC): ICD-10-CM

## 2024-08-20 ENCOUNTER — OFFICE VISIT (OUTPATIENT)
Dept: PHYSICAL MEDICINE AND REHAB | Facility: MEDICAL CENTER | Age: 59
End: 2024-08-20
Payer: COMMERCIAL

## 2024-08-20 VITALS
TEMPERATURE: 99.1 F | SYSTOLIC BLOOD PRESSURE: 142 MMHG | HEIGHT: 62 IN | DIASTOLIC BLOOD PRESSURE: 84 MMHG | HEART RATE: 101 BPM | WEIGHT: 96.12 LBS | BODY MASS INDEX: 17.69 KG/M2 | OXYGEN SATURATION: 98 %

## 2024-08-20 DIAGNOSIS — R20.2 NUMBNESS AND TINGLING OF BOTH LEGS: ICD-10-CM

## 2024-08-20 DIAGNOSIS — E11.65 TYPE 2 DIABETES MELLITUS WITH HYPERGLYCEMIA, WITHOUT LONG-TERM CURRENT USE OF INSULIN (HCC): ICD-10-CM

## 2024-08-20 DIAGNOSIS — G89.29 OTHER CHRONIC PAIN: ICD-10-CM

## 2024-08-20 DIAGNOSIS — M43.17 ANTEROLISTHESIS OF LUMBOSACRAL SPINE: ICD-10-CM

## 2024-08-20 DIAGNOSIS — M79.2 NEUROPATHIC PAIN: ICD-10-CM

## 2024-08-20 DIAGNOSIS — R20.0 NUMBNESS AND TINGLING OF BOTH LEGS: ICD-10-CM

## 2024-08-20 DIAGNOSIS — Z13.31 POSITIVE DEPRESSION SCREENING: ICD-10-CM

## 2024-08-20 PROCEDURE — 3079F DIAST BP 80-89 MM HG: CPT | Performed by: STUDENT IN AN ORGANIZED HEALTH CARE EDUCATION/TRAINING PROGRAM

## 2024-08-20 PROCEDURE — 1125F AMNT PAIN NOTED PAIN PRSNT: CPT | Performed by: STUDENT IN AN ORGANIZED HEALTH CARE EDUCATION/TRAINING PROGRAM

## 2024-08-20 PROCEDURE — 99204 OFFICE O/P NEW MOD 45 MIN: CPT | Performed by: STUDENT IN AN ORGANIZED HEALTH CARE EDUCATION/TRAINING PROGRAM

## 2024-08-20 PROCEDURE — 3077F SYST BP >= 140 MM HG: CPT | Performed by: STUDENT IN AN ORGANIZED HEALTH CARE EDUCATION/TRAINING PROGRAM

## 2024-08-20 RX ORDER — GABAPENTIN 300 MG/1
300 CAPSULE ORAL 3 TIMES DAILY
Qty: 90 CAPSULE | Refills: 2 | Status: SHIPPED | OUTPATIENT
Start: 2024-08-20

## 2024-08-20 ASSESSMENT — PATIENT HEALTH QUESTIONNAIRE - PHQ9
CLINICAL INTERPRETATION OF PHQ2 SCORE: 4
SUM OF ALL RESPONSES TO PHQ QUESTIONS 1-9: 12
5. POOR APPETITE OR OVEREATING: 0 - NOT AT ALL

## 2024-08-20 ASSESSMENT — PAIN SCALES - GENERAL: PAINLEVEL: 8=MODERATE-SEVERE PAIN

## 2024-08-20 ASSESSMENT — FIBROSIS 4 INDEX: FIB4 SCORE: 0.79

## 2024-08-20 NOTE — LETTER
August 20, 2024    To Whom It May Concern:         This is confirmation that Singh Malik attended his scheduled appointment with Rosa Ronquillo M.D. on 8/20/24. It is my professional medical opinion that he remain out of work until 9/3//24.         If you have any questions please do not hesitate to call me at the phone number listed below.    Sincerely,          Rosa Ronquillo M.D.  [phone]

## 2024-08-20 NOTE — Clinical Note
I referred this patient to you for an EMG.  Peripheral polyneuropathy vs lumbar radic screen. He has severe neuropathic pain in his bilateral lower extremities primarily in a stocking glove distribution.  I believe it is likely from diabetes given his A1c of 16.2% on 6/15/2024, however lumbar radiculopathy is also in the differential.

## 2024-08-20 NOTE — PROGRESS NOTES
services were used in the patient's primary language of [place]bourgish.     Name or Number: Jarett  Mode of interpretation: iPad    Content of Interpretation:      New Patient Note    Interventional Pain and Spine  Physiatry (Physical Medicine and Rehabilitation)     Patient Name: Singh Malik  : 1965  Date of Service: 2024  PCP: ROMAN Kyle  Referring Provider: Maria T Lay A.P.R.*    Chief Complaint:   Chief Complaint   Patient presents with    New Patient     Tingling in extremities       HPI  HISTORY FROM INITIAL VISIT (2024):    History of Present Illness  Singh Malik is a 58-year-old male who presents for a new patient visit. The visit was conducted in [place]ish with the assistance of an . He is accompanied by his sister Aleyda.    He reports experiencing numbness, tingling, and pain in his legs. Initially, he was diagnosed with metatarsalgia, followed by a diagnosis of diabetic neuropathy due to nerve damage. The most significant issue is numbness in both big toes. The pain fluctuates, becoming so severe at times that it impairs his ability to walk. He also experiences a sensation akin to electric shocks, particularly in his left foot. Despite trying various medications, including ibuprofen and acetaminophen, the pain persists. Gabapentin has provided some relief, reducing the pins-and-needles sensation by about 15 percent. He reports no side effects from the gabapentin.    The pain is most intense in his calf, but he does not report any leg weakness or shooting pain down his leg. The symptoms began approximately 5 to 6 months ago. His job involves standing for 12 hours a day, which causes discomfort in his feet and ankles. He initially attributed the pain to this prolonged standing. He reports no back pain. The pain is most noticeable upon waking after a long sleep.    He is currently taking metformin 2000 mg  twice daily. He requests a work excuse note for two weeks due to his discomfort and inability to work.    Psychological testing for pain as depression and pain commonly coexist and need to both be treated.     Opioid Risk Score: 0      Interpretation of Opioid Risk Score   Score 0-3 = Low risk of abuse. Do UDS at least once per year.  Score 4-7 = Moderate risk of abuse. Do UDS 1-4 times per year.  Score 8+ = High risk of abuse. Refer to specialist.    PHQ      12/8/2020     9:53 PM 8/20/2024     8:00 AM   Depression Screen (PHQ-2/PHQ-9)   PHQ-2 Total Score 0    PHQ-2 Total Score  4   PHQ-9 Total Score  12       Interpretation of PHQ-9 Total Score   Score Severity   1-4 No Depression   5-9 Mild Depression   10-14 Moderate Depression   15-19 Moderately Severe Depression   20-27 Severe Depression      Medical records review:  I reviewed the note from the referring provider Maria T Lay A.P.RSrinivasan*  ROS:   Red Flags ROS:   Fever, Chills, Sweats: Denies  Involuntary Weight Loss: Denies  Bladder Incontinence: Denies  Bowel Incontinence: denies  Saddle Anesthesia: Denies    All other systems reviewed and negative.     PMHx:   Past Medical History:   Diagnosis Date    Pneumonia due to COVID-19 virus 12/8/2020       PSHx:   History reviewed. No pertinent surgical history.    Family Hx:   Family History   Problem Relation Age of Onset    Diabetes Mother     Cancer Mother         Melanoma    Stroke Father     Breast Cancer Sister     Heart Disease Neg Hx     Hyperlipidemia Neg Hx     Hypertension Neg Hx        Social Hx:  Social History     Socioeconomic History    Marital status: Single     Spouse name: Not on file    Number of children: Not on file    Years of education: Not on file    Highest education level: Not on file   Occupational History    Not on file   Tobacco Use    Smoking status: Never    Smokeless tobacco: Never   Vaping Use    Vaping status: Never Used   Substance and Sexual Activity    Alcohol use: No    Drug  use: No    Sexual activity: Not Currently   Other Topics Concern    Not on file   Social History Narrative    Not on file     Social Determinants of Health     Financial Resource Strain: Not on file   Food Insecurity: Not on file   Transportation Needs: Not on file   Physical Activity: Not on file   Stress: Not on file   Social Connections: Not on file   Intimate Partner Violence: Not on file   Housing Stability: Not on file       Allergies:  No Known Allergies    Medications: reviewed on epic.   Outpatient Medications Marked as Taking for the 8/20/24 encounter (Office Visit) with Rosa Ronquillo M.D.   Medication Sig Dispense Refill    gabapentin (NEURONTIN) 300 MG Cap Take 1 Capsule by mouth 3 times a day. 90 Capsule 2    metformin (GLUCOPHAGE) 1000 MG tablet Take 1 Tablet by mouth 2 times a day with meals. 60 Tablet 2    meloxicam (MOBIC) 15 MG tablet Take 1 Tablet by mouth every day. 30 Tablet 1    metFORMIN (GLUCOPHAGE) 500 MG Tab Take 1 Tablet by mouth 2 times a day with meals. 60 Tablet 0    Blood Glucose Meter Kit Test blood sugar as recommended by provider.  Per insurance blood glucose monitoring kit. 1 Kit 0    Blood Glucose Test Strips Use one per insurance strip to test blood sugar once daily early morning before first meal. 100 Strip 0    Alcohol Swabs Wipe site with prep pad prior to injection. 100 Each 0        Current Outpatient Medications on File Prior to Visit   Medication Sig Dispense Refill    metformin (GLUCOPHAGE) 1000 MG tablet Take 1 Tablet by mouth 2 times a day with meals. 60 Tablet 2    meloxicam (MOBIC) 15 MG tablet Take 1 Tablet by mouth every day. 30 Tablet 1    metFORMIN (GLUCOPHAGE) 500 MG Tab Take 1 Tablet by mouth 2 times a day with meals. 60 Tablet 0    Blood Glucose Meter Kit Test blood sugar as recommended by provider.  Per insurance blood glucose monitoring kit. 1 Kit 0    Blood Glucose Test Strips Use one per insurance strip to test blood sugar once daily early morning before  "first meal. 100 Strip 0    Alcohol Swabs Wipe site with prep pad prior to injection. 100 Each 0    Lancets Use one per insurance lancet to test blood sugar once daily early morning before first meal. (Patient not taking: Reported on 8/6/2024) 100 Each 0    Insulin Syringes U-100 0.3 mL Use one syringe to inject insulin once daily. (Patient not taking: Reported on 6/15/2024) 100 Each 0     No current facility-administered medications on file prior to visit.         EXAMINATION     Physical Exam:   BP (!) 142/84 (BP Location: Right arm, Patient Position: Sitting, BP Cuff Size: Adult)   Pulse (!) 101   Temp 37.3 °C (99.1 °F) (Temporal)   Ht 1.57 m (5' 1.81\")   Wt 43.6 kg (96 lb 1.9 oz)   SpO2 98%     Constitutional:   Body Habitus: Body mass index is 17.69 kg/m².  Cooperation: Fully cooperates with exam  Appearance: Well-groomed, well-nourished.    Eyes: No scleral icterus to suggest severe liver disease, no proptosis to suggest severe hyperthyroidism    ENT -no obvious auditory deficits, no noticeable facial droop     Skin -no rashes or lesions noted     Respiratory-  breathing comfortably on room air, no audible wheezing    Cardiovascular-distal extremities warm and well perfused.  No lower extremity edema is noted.     Gastrointestinal - no obvious abdominal masses, non-distended    Psychiatric- alert and oriented ×3. Normal affect.     Gait -antalgic gait favoring left leg , no use of ambulatory device. Heel walking and toe walking intact.    Musculoskeletal and Neuro -     Thoracic/Lumbar Spine/Sacral Spine/Hips   Inspection: No evidence of atrophy in bilateral lower extremities throughout     There is limited active range of motion with lumbar extension    Facet loading maneuver positive on left, negative on right    Palpation:   Tenderness to palpation over the lumbar facets bilaterally spanning approximately L1-L5 levels. No tenderness to palpation at midline of lumbosacral spine.    Lumbar spine /hip " provocative exam maneuvers  Straight leg raise negative bilaterally  FADIR test negative bilaterally    SI joint tests  LOREE test negative bilaterally    Key points for the international standards for neurological classification of spinal cord injury (ISNCSCI) to light touch.   Dermatome R L   L2 2 2   L3 2 2   L4 1 distal from knee 1 distal from knee   L5 1 distal from knee 1 distal from knee   S1 1 distal from knee 1 distal from knee   S2 2 2       Motor Exam Lower Extremities  ? Myotome R L   Hip flexion L2 4- 4   Knee extension L3 4 4   Ankle dorsiflexion L4 5 5   Toe extension L5 5 5   Ankle plantarflexion S1 5 5       Reflexes  ?  R L   Patella  2+ 2+   Achilles   2+ 2+     Clonus of the ankle negative bilaterally       MEDICAL DECISION MAKING    Medical records review: see under HPI section.     DATA    Labs: Personally reviewed at today's visit:     Lab Results   Component Value Date/Time    SODIUM 137 07/19/2024 07:17 AM    POTASSIUM 4.3 07/19/2024 07:17 AM    CHLORIDE 98 07/19/2024 07:17 AM    CO2 25 07/19/2024 07:17 AM    ANION 14.0 07/19/2024 07:17 AM    GLUCOSE 237 (H) 07/19/2024 07:17 AM    BUN 8 07/19/2024 07:17 AM    CREATININE 0.66 07/19/2024 07:17 AM    CALCIUM 9.6 07/19/2024 07:17 AM    ASTSGOT 16 07/19/2024 07:17 AM    ALTSGPT 13 07/19/2024 07:17 AM    TBILIRUBIN 0.4 07/19/2024 07:17 AM    ALBUMIN 4.1 07/19/2024 07:17 AM    TOTPROTEIN 6.9 07/19/2024 07:17 AM    GLOBULIN 2.8 07/19/2024 07:17 AM    AGRATIO 1.5 07/19/2024 07:17 AM       Lab Results   Component Value Date/Time    PROTHROMBTM 12.8 12/08/2020 03:32 PM    INR 0.94 12/08/2020 03:32 PM        Lab Results   Component Value Date/Time    WBC 8.2 07/19/2024 07:17 AM    RBC 5.02 07/19/2024 07:17 AM    HEMOGLOBIN 15.8 07/19/2024 07:17 AM    HEMATOCRIT 43.9 07/19/2024 07:17 AM    MCV 87.5 07/19/2024 07:17 AM    MCH 31.5 07/19/2024 07:17 AM    MCHC 36.0 07/19/2024 07:17 AM    MPV 8.9 (L) 07/19/2024 07:17 AM    NEUTSPOLYS 57.70 07/19/2024 07:17  AM    LYMPHOCYTES 29.40 07/19/2024 07:17 AM    MONOCYTES 9.00 07/19/2024 07:17 AM    EOSINOPHILS 2.80 07/19/2024 07:17 AM    BASOPHILS 0.90 07/19/2024 07:17 AM        Lab Results   Component Value Date/Time    HBA1C 16.2 (H) 06/15/2024 11:20 AM        Imaging:   I personally reviewed following images, these are my reads    X-ray lumbar spine 6/29/2024  Bilateral L5 pars defects.  Transitional anatomy.  Grade 1 anterior listhesis of L5 on S1.    IMAGING radiology reads. I reviewed the following radiology reads     Results for orders placed during the hospital encounter of 08/10/10    MR-BRAIN-WITH & W/O    Results for orders placed during the hospital encounter of 12/17/13    MR-BRAIN-W/O    Impression  1.  Benign-appearing 12 mm raised lesion over the left parietal scalp with no significant change since 2010.  2.  Otherwise, unremarkable MRI of the brain with no significant change since 8/10/2010.        [facility]  [address] [facility] [address]                                                            Results for orders placed during the hospital encounter of 11/14/13    DX-CERVICAL SPINE-2 OR 3 VIEWS    Impression  Moderate C5-6 and C6-7 degenerative disc disease and slight probably chronic C4 anterolisthesis.            [facility] [address] [facility] [address]            Results for orders placed during the hospital encounter of 06/15/24    DX-FOOT-COMPLETE 3+ RIGHT    Impression  1.  Right mid foot and probably 1st metatarsophalangeal joint osteoarthritis    2.  Small vessel atherosclerotic plaque               Results for orders placed during the hospital encounter of 06/29/24    DX-LUMBAR SPINE-2 OR 3 VIEWS    Impression  1.  Transitional thoracolumbar and lumbosacral vertebrae.    2.  Grade 1 L5 anterolisthesis and bilateral L5 pars defects similar to previous.    3.  Multilevel degenerative disease mildly increased compared to previous.                         Diagnosis  Visit  Diagnoses     ICD-10-CM   1. Numbness and tingling of both legs  R20.0    R20.2   2. Other chronic pain  G89.29   3. Neuropathic pain  M79.2   4. Anterolisthesis of L5 on S1  M43.17   5. Positive depression screening  Z13.31   6. Type 2 diabetes mellitus with hyperglycemia, without long-term current use of insulin (HCC)  E11.65         ASSESSMENT AND PLAN:  Singh Malik ( 1965) is a male with severe neuropathic pain in his bilateral lower extremities primarily in a stocking glove distribution.  My impression at this time is that his pain is likely from diabetes given his A1c of 16.2% on 6/15/2024, however lumbar radiculopathy is also in the differential.     Singh was seen today for new patient.    Diagnoses and all orders for this visit:    Numbness and tingling of both legs  -     Referral to EMG - Physiatry (PMR)    Other chronic pain    Neuropathic pain    Anterolisthesis of L5 on S1    Positive depression screening  -     Patient has been identified as having a positive depression screening. Appropriate orders and counseling have been given.    Type 2 diabetes mellitus with hyperglycemia, without long-term current use of insulin ([facility])    Other orders  -     gabapentin (NEURONTIN) 300 MG Cap; Take 1 Capsule by mouth 3 times a day.          PLAN  Diagnostic workup: referral to Dr. Ho for EMG. Personally reviewed at today's visit: X-ray lumbar spine 2024    Medications:   - increase gabapentin to 300mg TID given neuropathic pain that is slightly improved without unwanted SE on gabapentin 200mg TID. Counseled on possible side effect of drowsiness and dizziness and discussed that the patient should reduce the dose back to 200 mg 3 times daily if the side effects are too severe.     Interventions:   -Caution with interventional procedures at this time, given most recent A1c of 16.2% on 6/15/2024    Other  - advise good control of A1c.    - letter written for patient to excuse from  work for the next 2 weeks    Follow-up: for EMG with Dr. Ho, then afterwards with me    Orders Placed This Encounter    Referral to EMG - Physiatry (PMR)    Patient has been identified as having a positive depression screening. Appropriate orders and counseling have been given.    gabapentin (NEURONTIN) 300 MG Cap       Rosa Ronquillo MD  Interventional Pain and Spine  Physical Medicine and Rehabilitation  [facility] Maria T Lay, A.P.R.*     The above note documents my personal evaluation of this patient. In addition, I have reviewed and confirmed with the patient and MA the supportive information documented in today's Patient Health Questionnaire and Office Note.     Please note that this dictation was created using voice recognition software. I have made every reasonable attempt to correct obvious errors, but I expect that there are errors of grammar and possibly content that I did not discover before finalizing the note.

## 2024-09-06 ENCOUNTER — OFFICE VISIT (OUTPATIENT)
Dept: PHYSICAL MEDICINE AND REHAB | Facility: MEDICAL CENTER | Age: 59
End: 2024-09-06
Payer: COMMERCIAL

## 2024-09-06 VITALS
DIASTOLIC BLOOD PRESSURE: 74 MMHG | WEIGHT: 95 LBS | BODY MASS INDEX: 17.94 KG/M2 | TEMPERATURE: 97.2 F | OXYGEN SATURATION: 100 % | HEART RATE: 93 BPM | HEIGHT: 61 IN | SYSTOLIC BLOOD PRESSURE: 122 MMHG

## 2024-09-06 DIAGNOSIS — R20.0 NUMBNESS AND TINGLING OF BOTH LEGS: ICD-10-CM

## 2024-09-06 DIAGNOSIS — R20.2 NUMBNESS AND TINGLING OF BOTH LEGS: ICD-10-CM

## 2024-09-06 PROCEDURE — 95885 MUSC TST DONE W/NERV TST LIM: CPT | Mod: RT | Performed by: GENERAL PRACTICE

## 2024-09-06 PROCEDURE — 95910 NRV CNDJ TEST 7-8 STUDIES: CPT | Performed by: GENERAL PRACTICE

## 2024-09-06 PROCEDURE — 1125F AMNT PAIN NOTED PAIN PRSNT: CPT | Performed by: GENERAL PRACTICE

## 2024-09-06 PROCEDURE — 3074F SYST BP LT 130 MM HG: CPT | Performed by: GENERAL PRACTICE

## 2024-09-06 PROCEDURE — 3078F DIAST BP <80 MM HG: CPT | Performed by: GENERAL PRACTICE

## 2024-09-06 ASSESSMENT — PAIN SCALES - GENERAL: PAINLEVEL: 8=MODERATE-SEVERE PAIN

## 2024-09-06 ASSESSMENT — FIBROSIS 4 INDEX: FIB4 SCORE: 0.79

## 2024-09-06 NOTE — PROCEDURES
"Electromyography Report          Name: Singh Malik    MRN: [id number]   YOB: 1965 (58 y.o.)   Sex: male   Vitals:  Vitals:    09/06/24 0858   BP: 122/74   Weight: 43.1 kg (95 lb)   Height: 1.549 m (5' 1\")     Body mass index is 17.95 kg/m².    Examining Physician: Didier Ho D.O.     Visit Diagnoses     ICD-10-CM   1. Numbness and tingling of both legs  R20.0    R20.2       EMG Examination Date: 9/6/2024     Impression:      This is  an abnormal and a technically limited study due to patient tolerance with needle exam.    Findings suggestive of a length-dependent peripheral neuropathy of the lower extremities.    Unable to rule out radiculopathy or plexopathy.  Findings suggestive of right fibular neuropathy with significantly decreased amplitude compared to the left .   Less likely mononeuropathy of the left fibular and bilateral tibial nerves     Recommendations: Follow up appointment and recommend MRI to assist with radiculopathy rule out.  Spent may consider repeat EMG/NCS testing in 3-6 months.        services were used in the patient's primary language of Yakut.      Name or Number: [id number]  Mode of interpretation: iPad    History: Stocking glove pattern sensation from ankle and distal especially at big toes; T2DM for the past few years.    Physical exam:   negative bilateral straight leg raise, decrease sensation from ankles to toes, MMT 4+/5 of proximal lower extremities otherwise 5/5.    Positive Tinel test right peroneal head.  Negative left    Nerve Conduction Studies and Electromyography  Examination Findings:      Nerve Conduction Studies Examination Findings:     NCS+  Motor Nerve Results      Latency Amplitude F-Lat Segment Distance CV Comment   Site (ms) Norm (mV) Norm (ms)  (cm) (m/s) Norm    Left Fibular (EDB) Motor   Ankle 7.4  < 6.5 2.8  > 2.0         Bel fib head 16.0 - 2.2 -  Bel fib head-Ankle 26 30  > 44    Pop fossa 19.6 - " 1.82 -  Pop fossa-Bel fib head 10 28  > 44    Right Fibular (EDB) Motor   Ankle 6.5  < 6.5 0.63  > 2.0         Bel fib head 15.1 - 0.77 -  Bel fib head-Ankle 28 33  > 44    Pop fossa 17.1 - 0.68 -  Pop fossa-Bel fib head 8 40  > 44    Left Tibial (AH) Motor   Ankle 7.4  < 5.8 4.2  > 4.0         Knee 17.4 - 3.2 -  Knee-Ankle 37 37  > 41    Right Tibial (AH) Motor   Ankle 6.3  < 5.8 8.9  > 4.0         Knee 18.0 - 6.3 -  Knee-Ankle 37 32  > 41      Sensory Sites      Neg Peak Lat Amplitude (O-P) Segment Distance Velocity Comment   Site (ms) Norm (µV) Norm  (cm) (ms)    Right Radial Sensory   Forearm-Wrist 2.6  < 2.9 13  > 15 Forearm-Wrist 10     Left Superficial Fibular Sensory   14 cm-Ankle NR  < 4.4 NR  > 6 14 cm-Ankle 14     Left Sural Sensory   Calf-Lat mall NR  < 4.4 NR  > 6 Calf-Lat mall 14     Right Sural Sensory   Calf-Lat mall 5.2  < 4.4 4  > 6 Calf-Lat mall 14       EMG+     Side Muscle Nerve Root Amp Dur Poly Recrt Int.Pat Comment   Right Tib ant Deep fib,  Fibular L4-L5 Nml Nml 0 Nml Nml    Right EHL Deep fib,  Fibular L5-S1 Nml Nml 0 Nml Nml    Right Vastus med Femoral L2-L4 Nml Nml 0 Nml Nml    Right Gastroc Tibial S1-S2 Nml Nml 0 Nml Nml    Difficulty relaxing for checking insertional activity.  Brief evaluations with muscle contraction and grossly seemed normal        Waveforms:    Motor                Sensory                Nerve conduction studies (NCS) and electromyography (EMG) are utilized to evaluate direct or indirect damage to the peripheral nervous system. NCS are performed to measure the nerve(s) response(s) to electrostimulation across a given nerve segment. EMG evaluates the passive and active electrical activity of the muscle(s) in question.  Muscles are innervated by specific peripheral nerves and roots. Often times, several nerves the muscle to be examined in order to determine the presence or absence of the disease process. Furthermore, nerves and muscles may need to be tested in a  ystk-in-hpeb comparison, as well as in additional extremities, as this may be crucial in characterizing the extent of the disease process, which may be diffuse or isolated and of varying degree of severity. The extent of the neurodiagnostic exam is justified as it may help arrive to a proper diagnosis, which ultimately may contribute to better management of the patient. Therefore, the nerves to muscles examined during the study were medically necessary.    Unless otherwise noted, temperature of the extremity(s) study was monitored before and during the examination and remained between 32 and 36 degrees C for the upper extremities, and between 30 and 36 degrees C for the lower extremities. The patient tolerated testing well, without any complications. The study was done with a  concentric needle examination.   Reference values are from the [facility] normative data guidelines and Mauro related to age guidelines.       cpt 45736. Nerve conduction studies, 7-8 studies  CPT 04404. needle electromyography, limited, each extremity.       Didier Ho D.O.

## 2024-09-06 NOTE — Clinical Note
Emily Lee,  He was not tolerating the test.  So I had a limited study and cannot rule out radiculopathy.  I suspect that he does have neuropathy of his feet secondary to his diabetes but there may also be an underlying right fibular mononeuropathy  Didier

## 2024-09-10 ENCOUNTER — OFFICE VISIT (OUTPATIENT)
Dept: PHYSICAL MEDICINE AND REHAB | Facility: MEDICAL CENTER | Age: 59
End: 2024-09-10
Payer: COMMERCIAL

## 2024-09-10 VITALS
HEIGHT: 62 IN | SYSTOLIC BLOOD PRESSURE: 149 MMHG | DIASTOLIC BLOOD PRESSURE: 90 MMHG | HEART RATE: 90 BPM | TEMPERATURE: 98 F | WEIGHT: 93.7 LBS | OXYGEN SATURATION: 100 % | BODY MASS INDEX: 17.24 KG/M2

## 2024-09-10 DIAGNOSIS — R20.0 NUMBNESS AND TINGLING OF BOTH LEGS: ICD-10-CM

## 2024-09-10 DIAGNOSIS — E11.42 DIABETIC POLYNEUROPATHY ASSOCIATED WITH TYPE 2 DIABETES MELLITUS (HCC): ICD-10-CM

## 2024-09-10 DIAGNOSIS — R20.2 NUMBNESS AND TINGLING OF BOTH LEGS: ICD-10-CM

## 2024-09-10 DIAGNOSIS — M79.2 NEUROPATHIC PAIN: ICD-10-CM

## 2024-09-10 DIAGNOSIS — G89.29 OTHER CHRONIC PAIN: ICD-10-CM

## 2024-09-10 DIAGNOSIS — E11.65 TYPE 2 DIABETES MELLITUS WITH HYPERGLYCEMIA, WITHOUT LONG-TERM CURRENT USE OF INSULIN (HCC): ICD-10-CM

## 2024-09-10 DIAGNOSIS — M43.17 ANTEROLISTHESIS OF LUMBOSACRAL SPINE: ICD-10-CM

## 2024-09-10 PROCEDURE — 3080F DIAST BP >= 90 MM HG: CPT | Performed by: STUDENT IN AN ORGANIZED HEALTH CARE EDUCATION/TRAINING PROGRAM

## 2024-09-10 PROCEDURE — 1125F AMNT PAIN NOTED PAIN PRSNT: CPT | Performed by: STUDENT IN AN ORGANIZED HEALTH CARE EDUCATION/TRAINING PROGRAM

## 2024-09-10 PROCEDURE — 3077F SYST BP >= 140 MM HG: CPT | Performed by: STUDENT IN AN ORGANIZED HEALTH CARE EDUCATION/TRAINING PROGRAM

## 2024-09-10 PROCEDURE — 99214 OFFICE O/P EST MOD 30 MIN: CPT | Performed by: STUDENT IN AN ORGANIZED HEALTH CARE EDUCATION/TRAINING PROGRAM

## 2024-09-10 RX ORDER — NORTRIPTYLINE HCL 10 MG
10 CAPSULE ORAL NIGHTLY
Qty: 30 CAPSULE | Refills: 2 | Status: SHIPPED | OUTPATIENT
Start: 2024-09-10

## 2024-09-10 RX ORDER — GABAPENTIN 300 MG/1
CAPSULE ORAL
Qty: 150 CAPSULE | Refills: 2 | Status: SHIPPED | OUTPATIENT
Start: 2024-09-10

## 2024-09-10 ASSESSMENT — PAIN SCALES - GENERAL: PAINLEVEL: 8=MODERATE-SEVERE PAIN

## 2024-09-10 ASSESSMENT — FIBROSIS 4 INDEX: FIB4 SCORE: 0.79

## 2024-09-10 ASSESSMENT — PATIENT HEALTH QUESTIONNAIRE - PHQ9: CLINICAL INTERPRETATION OF PHQ2 SCORE: 5

## 2024-09-10 NOTE — PROGRESS NOTES
Verbal consent was acquired by the patient to use [employer] ambient listening note generation during this visit Yes      services were used in the patient's primary language of Swedish.     Name or Number: Mckayla  Mode of interpretation: iPad    Content of Interpretation:    Follow-up patient Note    Interventional Pain and Spine  Physiatry (Physical Medicine and Rehabilitation)     Patient Name: Singh Malik  : 1965  Date of service: 9/10/2024    Chief Complaint:   Chief Complaint   Patient presents with    Follow-Up     Numbness and tingling of both legs         HISTORY  Please see new patient note by Dr. Ronquillo for more details.     HPI  Today's visit   Singh Malik ( 1965) is a male with Diagnoses of Numbness and tingling of both legs, Anterolisthesis of L5 on S1, Other chronic pain, Neuropathic pain, Diabetic polyneuropathy associated with type 2 diabetes mellitus (HCC), and Type 2 diabetes mellitus with hyperglycemia, without long-term current use of insulin (HCC) were pertinent to this visit.  History of Present Illness  The patient presents for evaluation of his diabetic neuropathy.     He presents today for EMG review.  He reports no improvement in his condition, experiencing numbness in his toes on both feet and severe pain that limits his standing time to 10-15 minutes. He describes a sensation of heaviness in his toes, as if they are coated with a thick ointment. He also experiences pain during the night and early morning, which induces panic. His current medication regimen includes gabapentin 300 mg three times daily, which he has not increased since his last visit. He notes excessive salivation which she is unsure as a side effect of the medicine.    He is currently taking metformin 1000 mg twice daily for his diabetes. The accompanying adult female mentions that his A1c levels were high, but his finger prick blood sugar readings  are around 140. She requests a referral to an endocrinologist as she has been sharing her own medication with him due to insufficient supply from his previous doctor.    He expresses concern about his ability to work due to his condition and requests a disability letter. He has been unable to work for the past 2 months and is worried about potential job loss. He is seeking a document stating his inability to work for a month due to his pain.        Pain severity 8/10 currently  Pt denies new numbness, tingling, or weakness.      ROS:   Red Flags ROS:   Fever, Chills, Sweats: Denies  Involuntary Weight Loss: Denies  Bladder Incontinence: Denies  Bowel Incontinence: denies  Saddle Anesthesia: Denies    All other systems reviewed and negative.     PMHx:   Past Medical History:   Diagnosis Date    Pneumonia due to COVID-19 virus 12/8/2020       PSHx:   History reviewed. No pertinent surgical history.    Family Hx:   Family History   Problem Relation Age of Onset    Diabetes Mother     Cancer Mother         Melanoma    Stroke Father     Breast Cancer Sister     Heart Disease Neg Hx     Hyperlipidemia Neg Hx     Hypertension Neg Hx        Social Hx:  Social History     Socioeconomic History    Marital status: Single     Spouse name: Not on file    Number of children: Not on file    Years of education: Not on file    Highest education level: Not on file   Occupational History    Not on file   Tobacco Use    Smoking status: Never    Smokeless tobacco: Never   Vaping Use    Vaping status: Never Used   Substance and Sexual Activity    Alcohol use: No    Drug use: No    Sexual activity: Not Currently   Other Topics Concern    Not on file   Social History Narrative    Not on file     Social Determinants of Health     Financial Resource Strain: Not on file   Food Insecurity: Not on file   Transportation Needs: Not on file   Physical Activity: Not on file   Stress: Not on file   Social Connections: Not on file   Intimate Partner  Violence: Not on file   Housing Stability: Not on file       Allergies:  No Known Allergies    Medications: reviewed on epic.   Outpatient Medications Marked as Taking for the 9/10/24 encounter (Office Visit) with Rosa Ronquillo M.D.   Medication Sig Dispense Refill    gabapentin (NEURONTIN) 300 MG Cap Take 300mg in the morning, 300mg in the afternoon, and 600mg at night for 1 week. Then increase to 300mg in the morning, 600mg in the afternoon, and 600mg at night for 1 week. Then increase to 600mg three times per day 150 Capsule 2    nortriptyline (PAMELOR) 10 MG Cap Take 1 Capsule by mouth every evening. 30 Capsule 2    Blood Glucose Meter Kit Test blood sugar as recommended by provider.  Per insurance blood glucose monitoring kit. 1 Kit 0    Blood Glucose Test Strips Use one per insurance strip to test blood sugar once daily early morning before first meal. 100 Strip 0    Alcohol Swabs Wipe site with prep pad prior to injection. 100 Each 0        Current Outpatient Medications on File Prior to Visit   Medication Sig Dispense Refill    Blood Glucose Meter Kit Test blood sugar as recommended by provider.  Per insurance blood glucose monitoring kit. 1 Kit 0    Blood Glucose Test Strips Use one per insurance strip to test blood sugar once daily early morning before first meal. 100 Strip 0    Alcohol Swabs Wipe site with prep pad prior to injection. 100 Each 0    metformin (GLUCOPHAGE) 1000 MG tablet Take 1 Tablet by mouth 2 times a day with meals. (Patient not taking: Reported on 9/10/2024) 60 Tablet 2    meloxicam (MOBIC) 15 MG tablet Take 1 Tablet by mouth every day. (Patient not taking: Reported on 9/10/2024) 30 Tablet 1    metFORMIN (GLUCOPHAGE) 500 MG Tab Take 1 Tablet by mouth 2 times a day with meals. (Patient not taking: Reported on 9/10/2024) 60 Tablet 0    Lancets Use one per insurance lancet to test blood sugar once daily early morning before first meal. (Patient not taking: Reported on 8/6/2024) 100 Each  "0    Insulin Syringes U-100 0.3 mL Use one syringe to inject insulin once daily. (Patient not taking: Reported on 6/15/2024) 100 Each 0     No current facility-administered medications on file prior to visit.         EXAMINATION     Physical Exam:   BP (!) 149/90 (BP Location: Right arm, Patient Position: Sitting, BP Cuff Size: Adult)   Pulse 90   Temp 36.7 °C (98 °F) (Temporal)   Ht 1.57 m (5' 1.81\")   Wt 42.5 kg (93 lb 11.1 oz)   SpO2 100%     Constitutional:   Body Habitus: Body mass index is 17.24 kg/m².  Cooperation: Fully cooperates with exam  Appearance: Well-groomed, well-nourished.    Eyes: No scleral icterus to suggest severe liver disease, no proptosis to suggest severe hyperthyroidism    ENT -no obvious auditory deficits, no noticeable facial droop     Skin -no rashes or lesions noted     Respiratory-  breathing comfortably on room air, no audible wheezing    Cardiovascular-distal extremities warm and well perfused.  No lower extremity edema is noted.     Gastrointestinal - no obvious abdominal masses, non-distended    Psychiatric- alert and oriented ×3. Normal affect.     Gait -antalgic gait favoring left leg , no use of ambulatory device. Heel walking and toe walking intact.     Musculoskeletal and Neuro -      Thoracic/Lumbar Spine/Sacral Spine/Hips   Inspection: No evidence of atrophy in bilateral lower extremities throughout      There is limited active range of motion with lumbar extension     Facet loading maneuver positive on left, negative on right     Palpation:   Tenderness to palpation over the lumbar facets bilaterally spanning approximately L1-L5 levels. No tenderness to palpation at midline of lumbosacral spine.     Lumbar spine /hip provocative exam maneuvers  Straight leg raise negative bilaterally  FADIR test negative bilaterally     SI joint tests  LOREE test negative bilaterally     Key points for the international standards for neurological classification of spinal cord injury " (ISNCSCI) to light touch.   Dermatome R L   L2 2 2   L3 2 2   L4 1 distal from knee 1 distal from knee   L5 1 distal from knee 1 distal from knee   S1 1 distal from knee 1 distal from knee   S2 2 2         Motor Exam Lower Extremities  ? Myotome R L   Hip flexion L2 4- 4   Knee extension L3 4 4   Ankle dorsiflexion L4 5 5   Toe extension L5 5 5   Ankle plantarflexion S1 5 5         Reflexes  ?   R L   Patella   2+ 2+   Achilles    2+ 2+      Clonus of the ankle negative bilaterally              MEDICAL DECISION MAKING    Medical records review: see under HPI section.     DATA    Labs: No new labs available for review since last visit.   Lab Results   Component Value Date/Time    SODIUM 137 07/19/2024 07:17 AM    POTASSIUM 4.3 07/19/2024 07:17 AM    CHLORIDE 98 07/19/2024 07:17 AM    CO2 25 07/19/2024 07:17 AM    ANION 14.0 07/19/2024 07:17 AM    GLUCOSE 237 (H) 07/19/2024 07:17 AM    BUN 8 07/19/2024 07:17 AM    CREATININE 0.66 07/19/2024 07:17 AM    CALCIUM 9.6 07/19/2024 07:17 AM    ASTSGOT 16 07/19/2024 07:17 AM    ALTSGPT 13 07/19/2024 07:17 AM    TBILIRUBIN 0.4 07/19/2024 07:17 AM    ALBUMIN 4.1 07/19/2024 07:17 AM    TOTPROTEIN 6.9 07/19/2024 07:17 AM    GLOBULIN 2.8 07/19/2024 07:17 AM    AGRATIO 1.5 07/19/2024 07:17 AM       Lab Results   Component Value Date/Time    PROTHROMBTM 12.8 12/08/2020 03:32 PM    INR 0.94 12/08/2020 03:32 PM        Lab Results   Component Value Date/Time    WBC 8.2 07/19/2024 07:17 AM    RBC 5.02 07/19/2024 07:17 AM    HEMOGLOBIN 15.8 07/19/2024 07:17 AM    HEMATOCRIT 43.9 07/19/2024 07:17 AM    MCV 87.5 07/19/2024 07:17 AM    MCH 31.5 07/19/2024 07:17 AM    MCHC 36.0 07/19/2024 07:17 AM    MPV 8.9 (L) 07/19/2024 07:17 AM    NEUTSPOLYS 57.70 07/19/2024 07:17 AM    LYMPHOCYTES 29.40 07/19/2024 07:17 AM    MONOCYTES 9.00 07/19/2024 07:17 AM    EOSINOPHILS 2.80 07/19/2024 07:17 AM    BASOPHILS 0.90 07/19/2024 07:17 AM        Lab Results   Component Value Date/Time    HBA1C 16.2 (H)  06/15/2024 11:20 AM      Dr. Ho EMG Examination Date: 9/6/2024      Impression:       This is  an abnormal and a technically limited study due to patient tolerance with needle exam.    Findings suggestive of a length-dependent peripheral neuropathy of the lower extremities.    Unable to rule out radiculopathy or plexopathy.  Findings suggestive of right fibular neuropathy with significantly decreased amplitude compared to the left .   Less likely mononeuropathy of the left fibular and bilateral tibial nerves     Imaging:   I personally reviewed following images, these are my reads  No new relevant imaging available for review since last visit.    IMAGING radiology reads. I reviewed the following radiology reads     Results for orders placed during the hospital encounter of 08/10/10    MR-BRAIN-WITH & W/O    Results for orders placed during the hospital encounter of 12/17/13    MR-BRAIN-W/O    Impression  1.  Benign-appearing 12 mm raised lesion over the left parietal scalp with no significant change since 2010.  2.  Otherwise, unremarkable MRI of the brain with no significant change since 8/10/2010.        [facility]  [address] [facility] [address]                                                            Results for orders placed during the hospital encounter of 11/14/13    DX-CERVICAL SPINE-2 OR 3 VIEWS    Impression  Moderate C5-6 and C6-7 degenerative disc disease and slight probably chronic C4 anterolisthesis.            [facility] [address] [facility] [address]            Results for orders placed during the hospital encounter of 06/15/24    DX-FOOT-COMPLETE 3+ RIGHT    Impression  1.  Right mid foot and probably 1st metatarsophalangeal joint osteoarthritis    2.  Small vessel atherosclerotic plaque               Results for orders placed during the hospital encounter of 06/29/24    DX-LUMBAR SPINE-2 OR 3 VIEWS    Impression  1.  Transitional thoracolumbar and lumbosacral  vertebrae.    2.  Grade 1 L5 anterolisthesis and bilateral L5 pars defects similar to previous.    3.  Multilevel degenerative disease mildly increased compared to previous.                         Diagnosis  Visit Diagnoses     ICD-10-CM   1. Numbness and tingling of both legs  R20.0    R20.2   2. Anterolisthesis of L5 on S1  M43.17   3. Other chronic pain  G89.29   4. Neuropathic pain  M79.2   5. Diabetic polyneuropathy associated with type 2 diabetes mellitus ([facility])  E11.42   6. Type 2 diabetes mellitus with hyperglycemia, without long-term current use of insulin ([facility])  E11.65         ASSESSMENT AND PLAN:  Singh Malik (: 1965) is a male with      Singh was seen today for follow-up.    Diagnoses and all orders for this visit:    Numbness and tingling of both legs  -     MR-LUMBAR SPINE-W/O; Future    Anterolisthesis of L5 on S1  -     MR-LUMBAR SPINE-W/O; Future    Other chronic pain    Neuropathic pain  -     MR-LUMBAR SPINE-W/O; Future    Diabetic polyneuropathy associated with type 2 diabetes mellitus (HCC)  -     Referral to Endocrinology    Type 2 diabetes mellitus with hyperglycemia, without long-term current use of insulin ([facility])  -     Referral to Endocrinology    Other orders  -     gabapentin (NEURONTIN) 300 MG Cap; Take 300mg in the morning, 300mg in the afternoon, and 600mg at night for 1 week. Then increase to 300mg in the morning, 600mg in the afternoon, and 600mg at night for 1 week. Then increase to 600mg three times per day  -     nortriptyline (PAMELOR) 10 MG Cap; Take 1 Capsule by mouth every evening.          PLAN  Diagnostic workup: reviewed EMG results today with patient.     Medications:   - start trial of nortriptyline 10mg QHS given neuropathic pain interfering with sleep. Discussed anticholinergic SE.  - increase gabapentin to 600mg TID uptitrating by 300mg weekly as above. Counseled on possible side effect of drowsiness and dizziness and discussed that the patient  should reduce the dose back to 200 mg 3 times daily if the side effects are too severe.      Interventions:   -Caution with interventional procedures at this time, given most recent A1c of 16.2% on 6/15/2024. Discussed that I would require improved A1c to be able to safely do a potential injection.      Other  - Referral to endocrinology Dr. Gabriel Payton today per patient request  - advise good control of A1c.    - letter written for patient to excuse from work until 10/10/24    Follow-up: after MRI    Orders Placed This Encounter    MR-LUMBAR SPINE-W/O    Referral to Endocrinology    gabapentin (NEURONTIN) 300 MG Cap    nortriptyline (PAMELOR) 10 MG Cap       Rosa Ronquillo MD  Interventional Pain and Spine  Physical Medicine and Rehabilitation  [facility]      The above note documents my personal evaluation of this patient. In addition, I have reviewed and confirmed with the patient and MA the supportive information documented in today's Patient Health Questionnaire and Office Note.     Please note that this dictation was created using voice recognition software. I have made every reasonable attempt to correct obvious errors, but I expect that there are errors of grammar and possibly content that I did not discover before finalizing the note.

## 2024-09-10 NOTE — LETTER
September 10, 2024    To Whom It May Concern:         This is confirmation that Singh Adameюлия Malik attended his scheduled appointment with Rosa Ronquillo M.D. on 9/10/24. It is my professional opinion that he remain out of work until 10/10/24.         If you have any questions please do not hesitate to call me at the phone number listed below.    Sincerely,          Rosa Ronquillo M.D.  [phone]

## 2024-09-26 ENCOUNTER — HOSPITAL ENCOUNTER (OUTPATIENT)
Dept: RADIOLOGY | Facility: MEDICAL CENTER | Age: 59
End: 2024-09-26
Attending: STUDENT IN AN ORGANIZED HEALTH CARE EDUCATION/TRAINING PROGRAM
Payer: COMMERCIAL

## 2024-09-26 DIAGNOSIS — M79.2 NEUROPATHIC PAIN: ICD-10-CM

## 2024-09-26 DIAGNOSIS — R20.2 NUMBNESS AND TINGLING OF BOTH LEGS: ICD-10-CM

## 2024-09-26 DIAGNOSIS — M43.17 ANTEROLISTHESIS OF LUMBOSACRAL SPINE: ICD-10-CM

## 2024-09-26 DIAGNOSIS — R20.0 NUMBNESS AND TINGLING OF BOTH LEGS: ICD-10-CM

## 2024-09-26 PROCEDURE — 72148 MRI LUMBAR SPINE W/O DYE: CPT

## 2024-10-04 ENCOUNTER — TELEPHONE (OUTPATIENT)
Dept: PHYSICAL MEDICINE AND REHAB | Facility: MEDICAL CENTER | Age: 59
End: 2024-10-04

## 2024-10-04 ENCOUNTER — OFFICE VISIT (OUTPATIENT)
Dept: PHYSICAL MEDICINE AND REHAB | Facility: MEDICAL CENTER | Age: 59
End: 2024-10-04
Payer: COMMERCIAL

## 2024-10-04 VITALS
SYSTOLIC BLOOD PRESSURE: 145 MMHG | HEART RATE: 111 BPM | OXYGEN SATURATION: 99 % | DIASTOLIC BLOOD PRESSURE: 98 MMHG | HEIGHT: 62 IN | BODY MASS INDEX: 17.12 KG/M2 | WEIGHT: 93.03 LBS | RESPIRATION RATE: 16 BRPM | TEMPERATURE: 97.9 F

## 2024-10-04 DIAGNOSIS — M43.17 ANTEROLISTHESIS OF LUMBOSACRAL SPINE: ICD-10-CM

## 2024-10-04 DIAGNOSIS — E11.65 TYPE 2 DIABETES MELLITUS WITH HYPERGLYCEMIA, WITHOUT LONG-TERM CURRENT USE OF INSULIN (HCC): ICD-10-CM

## 2024-10-04 DIAGNOSIS — R20.0 NUMBNESS AND TINGLING OF BOTH LEGS: ICD-10-CM

## 2024-10-04 DIAGNOSIS — M79.2 NEUROPATHIC PAIN: ICD-10-CM

## 2024-10-04 DIAGNOSIS — E11.42 DIABETIC POLYNEUROPATHY ASSOCIATED WITH TYPE 2 DIABETES MELLITUS (HCC): ICD-10-CM

## 2024-10-04 DIAGNOSIS — G89.29 OTHER CHRONIC PAIN: ICD-10-CM

## 2024-10-04 DIAGNOSIS — R20.2 NUMBNESS AND TINGLING OF BOTH LEGS: ICD-10-CM

## 2024-10-04 PROCEDURE — 3080F DIAST BP >= 90 MM HG: CPT | Performed by: STUDENT IN AN ORGANIZED HEALTH CARE EDUCATION/TRAINING PROGRAM

## 2024-10-04 PROCEDURE — 99214 OFFICE O/P EST MOD 30 MIN: CPT | Performed by: STUDENT IN AN ORGANIZED HEALTH CARE EDUCATION/TRAINING PROGRAM

## 2024-10-04 PROCEDURE — 3077F SYST BP >= 140 MM HG: CPT | Performed by: STUDENT IN AN ORGANIZED HEALTH CARE EDUCATION/TRAINING PROGRAM

## 2024-10-04 RX ORDER — GABAPENTIN 300 MG/1
CAPSULE ORAL
Qty: 150 CAPSULE | Refills: 2 | Status: SHIPPED | OUTPATIENT
Start: 2024-10-04

## 2024-10-04 ASSESSMENT — FIBROSIS 4 INDEX: FIB4 SCORE: 0.8

## 2024-10-15 ENCOUNTER — OFFICE VISIT (OUTPATIENT)
Dept: ENDOCRINOLOGY | Facility: MEDICAL CENTER | Age: 59
End: 2024-10-15
Attending: INTERNAL MEDICINE
Payer: COMMERCIAL

## 2024-10-15 VITALS
BODY MASS INDEX: 15.27 KG/M2 | SYSTOLIC BLOOD PRESSURE: 140 MMHG | DIASTOLIC BLOOD PRESSURE: 90 MMHG | OXYGEN SATURATION: 100 % | HEART RATE: 106 BPM | WEIGHT: 95 LBS | HEIGHT: 66 IN

## 2024-10-15 DIAGNOSIS — E10.42 TYPE 1 DIABETES MELLITUS WITH DIABETIC POLYNEUROPATHY (HCC): ICD-10-CM

## 2024-10-15 DIAGNOSIS — E11.65 TYPE 2 DIABETES MELLITUS WITH HYPERGLYCEMIA, WITHOUT LONG-TERM CURRENT USE OF INSULIN (HCC): ICD-10-CM

## 2024-10-15 DIAGNOSIS — R63.4 WEIGHT LOSS: ICD-10-CM

## 2024-10-15 DIAGNOSIS — G62.9 POLYNEUROPATHY: ICD-10-CM

## 2024-10-15 LAB
HBA1C MFR BLD: 7.5 % (ref ?–5.8)
POCT INT CON NEG: NEGATIVE
POCT INT CON POS: POSITIVE

## 2024-10-15 PROCEDURE — 99212 OFFICE O/P EST SF 10 MIN: CPT | Performed by: INTERNAL MEDICINE

## 2024-10-15 PROCEDURE — 83036 HEMOGLOBIN GLYCOSYLATED A1C: CPT | Performed by: INTERNAL MEDICINE

## 2024-10-15 PROCEDURE — 99204 OFFICE O/P NEW MOD 45 MIN: CPT | Performed by: INTERNAL MEDICINE

## 2024-10-15 PROCEDURE — 3077F SYST BP >= 140 MM HG: CPT | Performed by: INTERNAL MEDICINE

## 2024-10-15 PROCEDURE — 3080F DIAST BP >= 90 MM HG: CPT | Performed by: INTERNAL MEDICINE

## 2024-10-15 ASSESSMENT — FIBROSIS 4 INDEX: FIB4 SCORE: 0.8

## 2024-10-22 ENCOUNTER — TELEPHONE (OUTPATIENT)
Dept: ENDOCRINOLOGY | Facility: MEDICAL CENTER | Age: 59
End: 2024-10-22

## 2024-10-22 ENCOUNTER — HOSPITAL ENCOUNTER (OUTPATIENT)
Dept: LAB | Facility: MEDICAL CENTER | Age: 59
End: 2024-10-22
Attending: INTERNAL MEDICINE
Payer: COMMERCIAL

## 2024-10-22 DIAGNOSIS — E10.42 TYPE 1 DIABETES MELLITUS WITH DIABETIC POLYNEUROPATHY (HCC): ICD-10-CM

## 2024-10-22 DIAGNOSIS — E11.65 TYPE 2 DIABETES MELLITUS WITH HYPERGLYCEMIA, WITHOUT LONG-TERM CURRENT USE OF INSULIN (HCC): ICD-10-CM

## 2024-10-22 LAB
ALBUMIN SERPL BCP-MCNC: 4.4 G/DL (ref 3.2–4.9)
ALBUMIN/GLOB SERPL: 1.5 G/DL
ALP SERPL-CCNC: 122 U/L (ref 30–99)
ALT SERPL-CCNC: 18 U/L (ref 2–50)
ANION GAP SERPL CALC-SCNC: 14 MMOL/L (ref 7–16)
AST SERPL-CCNC: 16 U/L (ref 12–45)
BILIRUB SERPL-MCNC: 0.3 MG/DL (ref 0.1–1.5)
BUN SERPL-MCNC: 6 MG/DL (ref 8–22)
CALCIUM ALBUM COR SERPL-MCNC: 10.1 MG/DL (ref 8.5–10.5)
CALCIUM SERPL-MCNC: 10.4 MG/DL (ref 8.5–10.5)
CHLORIDE SERPL-SCNC: 99 MMOL/L (ref 96–112)
CHOLEST SERPL-MCNC: 204 MG/DL (ref 100–199)
CO2 SERPL-SCNC: 26 MMOL/L (ref 20–33)
CREAT SERPL-MCNC: 0.74 MG/DL (ref 0.5–1.4)
CREAT UR-MCNC: 42.1 MG/DL
EST. AVERAGE GLUCOSE BLD GHB EST-MCNC: 177 MG/DL
GFR SERPLBLD CREATININE-BSD FMLA CKD-EPI: 106 ML/MIN/1.73 M 2
GLOBULIN SER CALC-MCNC: 2.9 G/DL (ref 1.9–3.5)
GLUCOSE SERPL-MCNC: 366 MG/DL (ref 65–99)
HBA1C MFR BLD: 7.8 % (ref 4–5.6)
HDLC SERPL-MCNC: 53 MG/DL
LDLC SERPL CALC-MCNC: 94 MG/DL
MICROALBUMIN UR-MCNC: <1.2 MG/DL
MICROALBUMIN/CREAT UR: NORMAL MG/G (ref 0–30)
POTASSIUM SERPL-SCNC: 4 MMOL/L (ref 3.6–5.5)
PROT SERPL-MCNC: 7.3 G/DL (ref 6–8.2)
SODIUM SERPL-SCNC: 139 MMOL/L (ref 135–145)
TRIGL SERPL-MCNC: 285 MG/DL (ref 0–149)
TSH SERPL DL<=0.005 MIU/L-ACNC: 3.03 UIU/ML (ref 0.38–5.33)

## 2024-10-22 PROCEDURE — 80053 COMPREHEN METABOLIC PANEL: CPT

## 2024-10-22 PROCEDURE — 80061 LIPID PANEL: CPT

## 2024-10-22 PROCEDURE — 82570 ASSAY OF URINE CREATININE: CPT

## 2024-10-22 PROCEDURE — 84443 ASSAY THYROID STIM HORMONE: CPT

## 2024-10-22 PROCEDURE — 82043 UR ALBUMIN QUANTITATIVE: CPT

## 2024-10-22 PROCEDURE — 86341 ISLET CELL ANTIBODY: CPT

## 2024-10-22 PROCEDURE — 36415 COLL VENOUS BLD VENIPUNCTURE: CPT

## 2024-10-22 PROCEDURE — 83036 HEMOGLOBIN GLYCOSYLATED A1C: CPT

## 2024-10-24 ENCOUNTER — OFFICE VISIT (OUTPATIENT)
Dept: PHYSICAL MEDICINE AND REHAB | Facility: MEDICAL CENTER | Age: 59
End: 2024-10-24
Payer: COMMERCIAL

## 2024-10-24 VITALS
HEIGHT: 62 IN | BODY MASS INDEX: 17.4 KG/M2 | HEART RATE: 112 BPM | SYSTOLIC BLOOD PRESSURE: 146 MMHG | DIASTOLIC BLOOD PRESSURE: 103 MMHG | TEMPERATURE: 98.4 F | OXYGEN SATURATION: 99 % | WEIGHT: 94.58 LBS

## 2024-10-24 DIAGNOSIS — M43.17 ANTEROLISTHESIS OF LUMBOSACRAL SPINE: ICD-10-CM

## 2024-10-24 DIAGNOSIS — E11.42 DIABETIC POLYNEUROPATHY ASSOCIATED WITH TYPE 2 DIABETES MELLITUS (HCC): ICD-10-CM

## 2024-10-24 DIAGNOSIS — R20.0 NUMBNESS AND TINGLING OF BOTH LEGS: ICD-10-CM

## 2024-10-24 DIAGNOSIS — E11.65 TYPE 2 DIABETES MELLITUS WITH HYPERGLYCEMIA, WITHOUT LONG-TERM CURRENT USE OF INSULIN (HCC): ICD-10-CM

## 2024-10-24 DIAGNOSIS — G89.29 OTHER CHRONIC PAIN: ICD-10-CM

## 2024-10-24 DIAGNOSIS — R20.2 NUMBNESS AND TINGLING OF BOTH LEGS: ICD-10-CM

## 2024-10-24 DIAGNOSIS — M79.2 NEUROPATHIC PAIN: ICD-10-CM

## 2024-10-24 LAB — PANC ISLET CELL AB TITR SER: NORMAL {TITER}

## 2024-10-24 PROCEDURE — 1125F AMNT PAIN NOTED PAIN PRSNT: CPT | Performed by: STUDENT IN AN ORGANIZED HEALTH CARE EDUCATION/TRAINING PROGRAM

## 2024-10-24 PROCEDURE — 3077F SYST BP >= 140 MM HG: CPT | Performed by: STUDENT IN AN ORGANIZED HEALTH CARE EDUCATION/TRAINING PROGRAM

## 2024-10-24 PROCEDURE — 3080F DIAST BP >= 90 MM HG: CPT | Performed by: STUDENT IN AN ORGANIZED HEALTH CARE EDUCATION/TRAINING PROGRAM

## 2024-10-24 PROCEDURE — 99214 OFFICE O/P EST MOD 30 MIN: CPT | Performed by: STUDENT IN AN ORGANIZED HEALTH CARE EDUCATION/TRAINING PROGRAM

## 2024-10-24 RX ORDER — NORTRIPTYLINE HYDROCHLORIDE 10 MG/1
20 CAPSULE ORAL NIGHTLY
Qty: 90 CAPSULE | Refills: 2 | Status: SHIPPED | OUTPATIENT
Start: 2024-10-24

## 2024-10-24 ASSESSMENT — PAIN SCALES - GENERAL: PAINLEVEL: 8=MODERATE-SEVERE PAIN

## 2024-10-24 ASSESSMENT — FIBROSIS 4 INDEX: FIB4 SCORE: 0.68

## 2024-10-24 ASSESSMENT — PATIENT HEALTH QUESTIONNAIRE - PHQ9
CLINICAL INTERPRETATION OF PHQ2 SCORE: 3
SUM OF ALL RESPONSES TO PHQ QUESTIONS 1-9: 7
5. POOR APPETITE OR OVEREATING: 1 - SEVERAL DAYS

## 2024-10-25 ENCOUNTER — TELEPHONE (OUTPATIENT)
Dept: HEALTH INFORMATION MANAGEMENT | Facility: OTHER | Age: 59
End: 2024-10-25
Payer: COMMERCIAL

## 2024-10-25 DIAGNOSIS — E10.65 TYPE 1 DIABETES MELLITUS WITH HYPERGLYCEMIA (HCC): ICD-10-CM

## 2024-10-25 DIAGNOSIS — G62.89 OTHER POLYNEUROPATHY: ICD-10-CM

## 2024-10-25 DIAGNOSIS — R63.4 WEIGHT LOSS: ICD-10-CM

## 2024-10-25 RX ORDER — ACYCLOVIR 400 MG/1
1 TABLET ORAL
Qty: 1 EACH | Refills: 0 | Status: SHIPPED | OUTPATIENT
Start: 2024-10-25 | End: 2025-10-25

## 2024-10-25 RX ORDER — ACYCLOVIR 400 MG/1
1 TABLET ORAL
Qty: 9 EACH | Refills: 1 | Status: SHIPPED | OUTPATIENT
Start: 2024-10-25 | End: 2025-04-23

## 2024-10-26 LAB — GAD65 AB SER IA-ACNC: <5 IU/ML (ref 0–5)

## 2024-10-30 ENCOUNTER — TELEPHONE (OUTPATIENT)
Dept: PHYSICAL MEDICINE AND REHAB | Facility: MEDICAL CENTER | Age: 59
End: 2024-10-30
Payer: COMMERCIAL

## 2024-11-01 ENCOUNTER — TELEPHONE (OUTPATIENT)
Dept: ENDOCRINOLOGY | Facility: MEDICAL CENTER | Age: 59
End: 2024-11-01
Payer: COMMERCIAL

## 2024-11-01 NOTE — TELEPHONE ENCOUNTER
Received [employer] PA request via  [employer] for Dexcom G7 Sensor. (Quantity:3, Day Supply:30)  Insurance: [facility]  Member ID: [id number]  BIN: [id number]  PCN: N/[id number]  Group: [id number]   Ran Test claim via [place] [facility] medication-Pharmacy not in Network    Prior Authorization has been submitted via Cover My Meds: Key [id number])    Prior Authorization has been APPROVED  Prior Authorization reference number: [id number]  Effective dates: 11/1/2024-11/1/2025   Is patient eligible to fill with [facility]? No  Next Steps: Patient is currently filling prescription at preferred pharmacy: [id number].    Thank you,  Bee Santamaria [facility]  Endocrinology Pharmacy Coordinator

## 2024-11-05 ENCOUNTER — OFFICE VISIT (OUTPATIENT)
Dept: ENDOCRINOLOGY | Facility: MEDICAL CENTER | Age: 59
End: 2024-11-05
Attending: INTERNAL MEDICINE
Payer: COMMERCIAL

## 2024-11-05 VITALS
HEART RATE: 113 BPM | DIASTOLIC BLOOD PRESSURE: 79 MMHG | OXYGEN SATURATION: 98 % | SYSTOLIC BLOOD PRESSURE: 132 MMHG | BODY MASS INDEX: 18 KG/M2 | WEIGHT: 97.8 LBS | HEIGHT: 62 IN

## 2024-11-05 DIAGNOSIS — E11.65 TYPE 2 DIABETES MELLITUS WITH HYPERGLYCEMIA, WITHOUT LONG-TERM CURRENT USE OF INSULIN (HCC): ICD-10-CM

## 2024-11-05 DIAGNOSIS — M79.2 NEUROPATHIC PAIN: ICD-10-CM

## 2024-11-05 PROCEDURE — 3078F DIAST BP <80 MM HG: CPT | Performed by: INTERNAL MEDICINE

## 2024-11-05 PROCEDURE — 3075F SYST BP GE 130 - 139MM HG: CPT | Performed by: INTERNAL MEDICINE

## 2024-11-05 PROCEDURE — 99214 OFFICE O/P EST MOD 30 MIN: CPT | Performed by: INTERNAL MEDICINE

## 2024-11-05 PROCEDURE — 99211 OFF/OP EST MAY X REQ PHY/QHP: CPT | Performed by: INTERNAL MEDICINE

## 2024-11-05 ASSESSMENT — FIBROSIS 4 INDEX: FIB4 SCORE: 0.68

## 2024-11-05 NOTE — PROGRESS NOTES
Established Patient    Patient Care Team:  ROMAN Kyle as PCP - General (Nurse Practitioner Family)    Singh Malik is a 59 y.o. male who presents today with the following Chief Complaint(s): Follow up for Diagnoses of Type 2 diabetes mellitus with hyperglycemia, without long-term current use of insulin (HCC) [E11.65] and Neuropathic pain were pertinent to this visit.    HPI:  Patient is 59-year-old male with type 1 diabetes (possible type 2 diabetes) severe diabetic polyneuropathy, weight loss, and dyslipidemia.  Patient was diagnosed with type 2 diabetes approximately 5 years ago following a bout with COVID.  Patient reports that his mother sister and uncle all have type 2 diabetes and passed away as a result of it.  Patient was originally started on metformin for a few months, but following that his labs were all reassessed and he was told he did not need to be on anything and metformin was discontinued (this was approximately in 2019)  Of note patient's A1c in June 2024 was 16.5  His current A1c on 10/15/2024 is 7.5%  Patient reports that he began having diabetic retinopathy at least several months ago.  He has not yet seen a neurologist but has been told by many people that his neuropathy is secondary to his diabetes despite his very recent diagnosis.  His prior weight to COVID was approximately 152 pounds, but he now weighs 95 pounds and his BMI is approximately 50.  He reports that this is secondary to him simply feeling less hungry.  Patient does have some diarrhea but denies vomiting.  He does report eating less.    10/2024 CHRISTINA ab <5.0, islet cell ab <1.0, A1c 7.8%  He has not picked up the dexcom yet    ROS:  Per HPI, otherwise: Negative  General: Denies fever/chills, weight loss, fatigue, recent illness, weakness  Skin:  Denies rashes, lesions  HEENT: Denies sore throat  Resp:  Denies SOB, dypsnea on exertion  CV:  Denies chest pain, palpitations, diaphoresis  GI:  Denies  nausea/vomiting, melena, PUD  :  Denies dysuria, urgency, frequency, hematuria  MS:  Denies other joint pain, myalgias  Neuro:  Marked bilat LE neuropathy  Endo:  Denies osteoporosis    Past Medical History:   Diagnosis Date    Pneumonia due to COVID-19 virus 12/8/2020     Social History     Tobacco Use    Smoking status: Never    Smokeless tobacco: Never   Vaping Use    Vaping status: Never Used   Substance Use Topics    Alcohol use: No    Drug use: No     Current Outpatient Medications   Medication Sig Dispense Refill    tizanidine (ZANAFLEX) 4 MG Tab Take 0.5-1 Tablets by mouth at bedtime as needed (muscle spasms). 30 Tablet 2    gabapentin (NEURONTIN) 300 MG Cap Take 600mg in the morning, 600mg in the afternoon, and 900mg at night for 1 week. Then increase to 600mg in the morning, 900mg in the afternoon, and 900mg at night for 1 week. Then increase to 900mg three times per day (Patient taking differently: Take 600mg in the morning, 600mg in the afternoon, and 900mg at night for 1 week. Then increase to 600mg in the morning, 900mg in the afternoon, and 900mg at night for 1 week. Then increase to 900mg three times per day    - Pt states he is taking 900 mg 3 times a day) 150 Capsule 2    metformin (GLUCOPHAGE) 1000 MG tablet Take 1 Tablet by mouth 2 times a day with meals. 60 Tablet 2    Continuous Glucose Sensor (DEXCOM G7 SENSOR) Misc 1 Each every 10 days for 180 days. (Patient not taking: Reported on 11/5/2024) 9 Each 1    Continuous Glucose  (DEXCOM G7 ) Device 1 Each Once Every 12 Months. (Patient not taking: Reported on 11/5/2024) 1 Each 0    nortriptyline (PAMELOR) 10 MG Cap Take 2 Capsules by mouth every evening. 90 Capsule 2    meloxicam (MOBIC) 15 MG tablet Take 1 Tablet by mouth every day. (Patient not taking: Reported on 9/10/2024) 30 Tablet 1    metFORMIN (GLUCOPHAGE) 500 MG Tab Take 1 Tablet by mouth 2 times a day with meals. (Patient not taking: Reported on 11/5/2024) 60 Tablet 0  "   Blood Glucose Meter Kit Test blood sugar as recommended by provider.  Per insurance blood glucose monitoring kit. (Patient not taking: Reported on 11/5/2024) 1 Kit 0    Blood Glucose Test Strips Use one per insurance strip to test blood sugar once daily early morning before first meal. (Patient not taking: Reported on 11/5/2024) 100 Strip 0    Lancets Use one per insurance lancet to test blood sugar once daily early morning before first meal. (Patient not taking: Reported on 11/5/2024) 100 Each 0    Alcohol Swabs Wipe site with prep pad prior to injection. (Patient not taking: Reported on 11/5/2024) 100 Each 0    Insulin Syringes U-100 0.3 mL Use one syringe to inject insulin once daily. (Patient not taking: Reported on 11/5/2024) 100 Each 0     No current facility-administered medications for this visit.       /79 (BP Location: Right arm, Patient Position: Sitting, BP Cuff Size: Small adult)   Pulse (!) 113   Ht 1.57 m (5' 1.81\")   Wt 44.4 kg (97 lb 12.8 oz)   SpO2 98%   BMI 18.00 kg/m²     Physical Exam:   Gen:           Alert and oriented, No apparent distress. Mood and affect appropriate, normal interaction with examiner.   Hearing:     Grossly intact.  Nose:          Normal, no lesions or deformities.  Dentition:    Good dentition.   Oropharynx:   Tongue normal, posterior pharynx without erythema or exudate.  Neck:        Supple, trachea midline, no masses.  Respiratory Effort: No intercostal retractions or use of accessory muscles.   Gait and Station: Uses cane 2/2 nueropathy      Assessment and Plan:     1. Type 2 diabetes mellitus with hyperglycemia, without long-term current use of insulin (HCC) [E11.65]  Labs negative for markers of DM1  Pt to  dexcom and f/u with us in 4-5 weeks for review  A1c only 7.8%, but recent sugars in the 200-400 range  I think we are missing great variability in his glycemic control (he doesn't check his sugars), which is likely contributing to his " neuropathic pain    2. Neuropathic pain  Pt has appt with neurology this week and sees pain management currently  This is likely 2/2 his uncontrolled dm2, but it is strange given the rapid onset, so I would like neuro to assess      No orders of the defined types were placed in this encounter.      No follow-ups on file.    Please note that this dictation was created using voice recognition software. I have made every reasonable attempt to correct obvious errors, but I expect that there are errors of grammar and possibly content that I did not discover before finalizing the note.     Gabriel Payton M.D.

## 2024-11-11 ENCOUNTER — OFFICE VISIT (OUTPATIENT)
Dept: NEUROLOGY | Facility: MEDICAL CENTER | Age: 59
End: 2024-11-11
Attending: SPECIALIST
Payer: COMMERCIAL

## 2024-11-11 ENCOUNTER — HOSPITAL ENCOUNTER (OUTPATIENT)
Dept: LAB | Facility: MEDICAL CENTER | Age: 59
End: 2024-11-11
Attending: SPECIALIST
Payer: COMMERCIAL

## 2024-11-11 VITALS
HEART RATE: 111 BPM | WEIGHT: 98.99 LBS | SYSTOLIC BLOOD PRESSURE: 130 MMHG | HEIGHT: 61 IN | RESPIRATION RATE: 12 BRPM | TEMPERATURE: 99.8 F | BODY MASS INDEX: 18.69 KG/M2 | DIASTOLIC BLOOD PRESSURE: 88 MMHG | OXYGEN SATURATION: 96 %

## 2024-11-11 DIAGNOSIS — G62.9 NEUROPATHY: Primary | ICD-10-CM

## 2024-11-11 DIAGNOSIS — M54.2 NECK PAIN: ICD-10-CM

## 2024-11-11 DIAGNOSIS — G62.9 NEUROPATHY: ICD-10-CM

## 2024-11-11 LAB — ERYTHROCYTE [SEDIMENTATION RATE] IN BLOOD BY WESTERGREN METHOD: 11 MM/HOUR (ref 0–20)

## 2024-11-11 PROCEDURE — 85652 RBC SED RATE AUTOMATED: CPT

## 2024-11-11 PROCEDURE — 36415 COLL VENOUS BLD VENIPUNCTURE: CPT

## 2024-11-11 PROCEDURE — 84155 ASSAY OF PROTEIN SERUM: CPT

## 2024-11-11 PROCEDURE — 3075F SYST BP GE 130 - 139MM HG: CPT | Performed by: SPECIALIST

## 2024-11-11 PROCEDURE — 84165 PROTEIN E-PHORESIS SERUM: CPT

## 2024-11-11 PROCEDURE — 99212 OFFICE O/P EST SF 10 MIN: CPT | Performed by: SPECIALIST

## 2024-11-11 PROCEDURE — 3079F DIAST BP 80-89 MM HG: CPT | Performed by: SPECIALIST

## 2024-11-11 PROCEDURE — 86038 ANTINUCLEAR ANTIBODIES: CPT

## 2024-11-11 PROCEDURE — 99204 OFFICE O/P NEW MOD 45 MIN: CPT | Performed by: SPECIALIST

## 2024-11-11 RX ORDER — DULOXETIN HYDROCHLORIDE 30 MG/1
30 CAPSULE, DELAYED RELEASE ORAL DAILY
Qty: 30 CAPSULE | Refills: 11 | Status: SHIPPED | OUTPATIENT
Start: 2024-11-11

## 2024-11-11 RX ORDER — NORTRIPTYLINE HYDROCHLORIDE 50 MG/1
50 CAPSULE ORAL NIGHTLY
Qty: 30 CAPSULE | Refills: 11 | Status: SHIPPED
Start: 2024-11-11 | End: 2024-11-20

## 2024-11-11 ASSESSMENT — FIBROSIS 4 INDEX: FIB4 SCORE: 0.68

## 2024-11-11 ASSESSMENT — PATIENT HEALTH QUESTIONNAIRE - PHQ9
SUM OF ALL RESPONSES TO PHQ QUESTIONS 1-9: 17
CLINICAL INTERPRETATION OF PHQ2 SCORE: 4
5. POOR APPETITE OR OVEREATING: 2 - MORE THAN HALF THE DAYS

## 2024-11-11 NOTE — PROGRESS NOTES
Subjective     Singh Malik is a 59 y.o. male who presents with New Patient (1 diabetes mellitus with diabetic polyneuropathy)            HPI  Mr. Singh Malik is a 59-year-old gentleman referred by Dr. Rosa Ronquillo for evaluation of a painful neuropathy.  He comes in with his wife who provides much of the history.  Interview was conducted via a telemetry  services in Citizen of [place], the patient's native language.  He describes a history of approximately 4 years of pain in his feet.  He has numbness in his feet.  In addition he has pain in the back of his legs radiating down to the feet.  He has a history of type 1 diabetes which has been treated for approximately 4 years he takes gabapentin 600 mg in the morning, 600 mg in the afternoon and 900 mg at night.  He does feel that that helped somewhat.  He has difficulty with his balance and uses a cane to walk.  He does not have numbness in his upper extremities.  He had an EMG done by physiatry in June.  The findings were suggestive of a length-dependent peripheral neuropathy in the lower extremities.  His right peroneal motor amplitude was 0.6 mV, the left was 2.8, left tibial onset latency was 7.4 ms with an amplitude of 4.2 mV, right was 6.3 ms with an amplitude of 8.9 mV.  His sural sensory response was NR on the left and low amplitude of 4 µV on the right.  He does not have a family history of neuropathy.  A lumbar MRI scan on September 26 revealed no significant canal or foraminal narrowing.  There was grade 1 spondylolisthesis of L5 over S1 with a bilateral far L5 defect.  In addition he states he has significant chronic cervical pain and has pain radiating down his legs.    Past medical history:    1.  Type 2 diabetes    2.  Peripheral numbness and pain as above diagnosed as a neuropathy on EMG.    3.  Otherwise denies significant surgical or medical illness    Medications-gabapentin 900 mg 3 times daily, insulin, Mobic 15 mg daily,  "Glucophage 100 mg daily, panel are being milligrams at night, Zanaflex 4 mg as needed    Allergies-NKDA    Social history-non-smoker nondrinker    Family history-positive for diabetes and stroke    ROS  As above, reports pain in his feet in particular at night that limits his sleeping.         Objective     /88 (BP Location: Left arm, Patient Position: Sitting, BP Cuff Size: Adult)   Pulse (!) 111   Temp 37.7 °C (99.8 °F) (Temporal)   Resp 12   Ht 1.549 m (5' 1\")   Wt 44.9 kg (98 lb 15.8 oz)   SpO2 96%   BMI 18.70 kg/m²      Physical Exam  Patient is a cooperative gentleman who is alert.  Appears his stated age.  Speech is fluent mental status is grossly intact.    HEENT exam reveals him to be normocephalic and atraumatic.  Pupils are equal round reactive.  EOMs are full visual fields are full.  Optic disks are flat.    His neck is supple.        Neurological Exam  He stands without difficulty.  His gait is mildly wide-based and he walks with a cane.    Motor testing reveals intact bulk tone and strength throughout.    Sensory testing is intact to pin and vibration.    Reflexes are 1+ at the arms and knees absent at the ankles with downgoing toes    Cranial nerves are unremarkable.           Assessment & Plan   Mr. Otoniel Malik is a 59-year-old gentleman with a 4-year history of foot numbness and pain and a history of diabetes.  He has a neuropathy on EMG which may well be due to his diabetes.  Other potential causes of neuropathy will be evaluated.  He is already taking gabapentin and will continue that medication.  He will try upping his nortriptyline dose to 50 mg at at bedtime.  In addition he requests a prescription for Cymbalta and will take 30 mg daily.  I will see him back in 1 to 2 months.     Assessment & Plan  Neuropathy    Orders:    nortriptyline (PAMELOR) 50 MG capsule; Take 1 Capsule by mouth every evening.    Sed Rate; Future    AMILCAR W/REFLEX IF POSITIVE    SPEP W/REFLEX TO CESILIA, A, G, M; " Future    DULoxetine (CYMBALTA) 30 MG Cap DR Particles; Take 1 Capsule by mouth every day.  He reports cervical pain.  He has minimal findings on examination.  He will have an MRI of the cervical spine as he was told he had a disc protrusion on x-ray.  I will see him back after that.  Neck pain    Orders:    MR-CERVICAL SPINE-W/O; Future

## 2024-11-12 LAB — NUCLEAR IGG SER QL IA: NORMAL

## 2024-11-13 LAB
ALBUMIN SERPL ELPH-MCNC: 4.39 G/DL (ref 3.75–5.01)
ALPHA1 GLOB SERPL ELPH-MCNC: 0.27 G/DL (ref 0.19–0.46)
ALPHA2 GLOB SERPL ELPH-MCNC: 0.76 G/DL (ref 0.48–1.05)
B-GLOBULIN SERPL ELPH-MCNC: 0.74 G/DL (ref 0.48–1.1)
GAMMA GLOB SERPL ELPH-MCNC: 0.85 G/DL (ref 0.62–1.51)
INTERPRETATION SERPL IFE-IMP: NORMAL
MONOCLON BAND OBS SERPL: NORMAL
MONOCLONAL PROTEIN NL11656: NORMAL G/DL
PATHOLOGY STUDY: NORMAL
PROT SERPL-MCNC: 7 G/DL (ref 6.3–8.2)

## 2024-11-20 ENCOUNTER — OFFICE VISIT (OUTPATIENT)
Dept: PHYSICAL MEDICINE AND REHAB | Facility: MEDICAL CENTER | Age: 59
End: 2024-11-20
Payer: COMMERCIAL

## 2024-11-20 ENCOUNTER — TELEPHONE (OUTPATIENT)
Dept: ENDOCRINOLOGY | Facility: MEDICAL CENTER | Age: 59
End: 2024-11-20

## 2024-11-20 VITALS
OXYGEN SATURATION: 97 % | BODY MASS INDEX: 17.65 KG/M2 | SYSTOLIC BLOOD PRESSURE: 132 MMHG | HEART RATE: 114 BPM | TEMPERATURE: 97.8 F | DIASTOLIC BLOOD PRESSURE: 91 MMHG | HEIGHT: 62 IN | WEIGHT: 95.9 LBS

## 2024-11-20 DIAGNOSIS — M79.2 NEUROPATHIC PAIN: ICD-10-CM

## 2024-11-20 DIAGNOSIS — E11.65 TYPE 2 DIABETES MELLITUS WITH HYPERGLYCEMIA, WITHOUT LONG-TERM CURRENT USE OF INSULIN (HCC): ICD-10-CM

## 2024-11-20 DIAGNOSIS — R20.2 NUMBNESS AND TINGLING OF BOTH LEGS: ICD-10-CM

## 2024-11-20 DIAGNOSIS — M43.17 ANTEROLISTHESIS OF LUMBOSACRAL SPINE: ICD-10-CM

## 2024-11-20 DIAGNOSIS — R20.0 NUMBNESS AND TINGLING OF BOTH LEGS: ICD-10-CM

## 2024-11-20 DIAGNOSIS — G89.29 OTHER CHRONIC PAIN: ICD-10-CM

## 2024-11-20 DIAGNOSIS — E11.42 DIABETIC POLYNEUROPATHY ASSOCIATED WITH TYPE 2 DIABETES MELLITUS (HCC): ICD-10-CM

## 2024-11-20 PROCEDURE — 1126F AMNT PAIN NOTED NONE PRSNT: CPT | Performed by: STUDENT IN AN ORGANIZED HEALTH CARE EDUCATION/TRAINING PROGRAM

## 2024-11-20 PROCEDURE — 99214 OFFICE O/P EST MOD 30 MIN: CPT | Performed by: STUDENT IN AN ORGANIZED HEALTH CARE EDUCATION/TRAINING PROGRAM

## 2024-11-20 PROCEDURE — 3075F SYST BP GE 130 - 139MM HG: CPT | Performed by: STUDENT IN AN ORGANIZED HEALTH CARE EDUCATION/TRAINING PROGRAM

## 2024-11-20 PROCEDURE — 3080F DIAST BP >= 90 MM HG: CPT | Performed by: STUDENT IN AN ORGANIZED HEALTH CARE EDUCATION/TRAINING PROGRAM

## 2024-11-20 RX ORDER — NORTRIPTYLINE HYDROCHLORIDE 10 MG/1
CAPSULE ORAL
Qty: 160 CAPSULE | Refills: 2 | Status: SHIPPED | OUTPATIENT
Start: 2024-11-20 | End: 2025-03-13

## 2024-11-20 ASSESSMENT — PATIENT HEALTH QUESTIONNAIRE - PHQ9: CLINICAL INTERPRETATION OF PHQ2 SCORE: 0

## 2024-11-20 ASSESSMENT — PAIN SCALES - GENERAL: PAINLEVEL_OUTOF10: NO PAIN

## 2024-11-20 ASSESSMENT — FIBROSIS 4 INDEX: FIB4 SCORE: 0.68

## 2024-11-20 NOTE — TELEPHONE ENCOUNTER
Patient came into the clinic requesting a refill for his medication. He requested for a 90-day supply to be sent to the following pharmacy:     [id number] - [place], [place] - [address] [[id number]]     Thank you,  Bee Santamaria, [facility]  Endocrinology Pharmacy Coordinator

## 2024-11-20 NOTE — PROGRESS NOTES
Verbal consent was acquired by the patient to use [employer] ambient listening note generation during this visit Yes      services were used in the patient's primary language of Khmer.     Name or Number: Inocente  Mode of interpretation: iPad    Content of Interpretation:    Follow-up patient Note    Interventional Pain and Spine  Physiatry (Physical Medicine and Rehabilitation)     Patient Name: Singh Malik  : 1965  Date of service: 2024    Chief Complaint:   Chief Complaint   Patient presents with    Follow-Up     Diabetic polyneuropathy associated with type 2 diabetes mellitus       HISTORY  Please see new patient note by Dr. Ronquillo for more details.     HPI  Today's visit   Singh Malik ( 1965) is a male with Diagnoses of Diabetic polyneuropathy associated with type 2 diabetes mellitus (HCC), Numbness and tingling of both legs, Other chronic pain, Anterolisthesis of L5 on S1, Neuropathic pain, and Type 2 diabetes mellitus with hyperglycemia, without long-term current use of insulin (HCC) were pertinent to this visit.  History of Present Illness  The patient is a 59-year-old male who presents for a follow-up visit.     He reports improvement in pain with the medicine. Currently taking gabapentin 900mg TID,  cymbalta 30mg daily, and nortriptyline 20mg QHS.  Pain currently rates 0/10 on NRS.    He saw neurology and was prescribed nortriptyline 50mg QHS but hasn't tried this dose. He was not able to  the muscle relaxer.    Hoping to return to work.  Would like a work note to reflect this today.      At today's visit I reviewed his neurology note 2024 and endocrinology note 2024.  His latest hemoglobin A1c is 7.8% on 10/22/2024.  His AMILCAR, SPEP, and ESR were within normal limits.    ROS:   Red Flags ROS:   Fever, Chills, Sweats: Denies  Involuntary Weight Loss: Denies  Bladder Incontinence: Denies  Bowel Incontinence:  denies  Saddle Anesthesia: Denies    All other systems reviewed and negative.     PMHx:   Past Medical History:   Diagnosis Date    Pneumonia due to COVID-19 virus 12/8/2020       PSHx:   History reviewed. No pertinent surgical history.    Family Hx:   Family History   Problem Relation Age of Onset    Diabetes Mother     Cancer Mother         Melanoma    Stroke Father     Breast Cancer Sister     Heart Disease Neg Hx     Hyperlipidemia Neg Hx     Hypertension Neg Hx        Social Hx:  Social History     Socioeconomic History    Marital status: Single     Spouse name: Not on file    Number of children: Not on file    Years of education: Not on file    Highest education level: Not on file   Occupational History    Not on file   Tobacco Use    Smoking status: Never    Smokeless tobacco: Never   Vaping Use    Vaping status: Never Used   Substance and Sexual Activity    Alcohol use: No    Drug use: No    Sexual activity: Not Currently   Other Topics Concern    Not on file   Social History Narrative    Not on file     Social Drivers of Health     Financial Resource Strain: Not on file   Food Insecurity: Not on file   Transportation Needs: Not on file   Physical Activity: Not on file   Stress: Not on file   Social Connections: Not on file   Intimate Partner Violence: Not on file   Housing Stability: Not on file       Allergies:  No Known Allergies    Medications: reviewed on epic.   Outpatient Medications Marked as Taking for the 11/20/24 encounter (Office Visit) with Rosa Ronquillo M.D.   Medication Sig Dispense Refill    nortriptyline (PAMELOR) 10 MG Cap Take 3 Capsules by mouth every evening for 7 days, THEN 4 Capsules every evening for 7 days, THEN 5 Capsules every evening for 99 days. 160 Capsule 2    DULoxetine (CYMBALTA) 30 MG Cap DR Particles Take 1 Capsule by mouth every day. 30 Capsule 11    gabapentin (NEURONTIN) 300 MG Cap Take 600mg in the morning, 600mg in the afternoon, and 900mg at night for 1 week. Then  increase to 600mg in the morning, 900mg in the afternoon, and 900mg at night for 1 week. Then increase to 900mg three times per day (Patient taking differently: Take 600mg in the morning, 600mg in the afternoon, and 900mg at night for 1 week. Then increase to 600mg in the morning, 900mg in the afternoon, and 900mg at night for 1 week. Then increase to 900mg three times per day    - Pt states he is taking 900 mg 3 times a day) 150 Capsule 2    metformin (GLUCOPHAGE) 1000 MG tablet Take 1 Tablet by mouth 2 times a day with meals. 60 Tablet 2        Current Outpatient Medications on File Prior to Visit   Medication Sig Dispense Refill    DULoxetine (CYMBALTA) 30 MG Cap DR Particles Take 1 Capsule by mouth every day. 30 Capsule 11    tizanidine (ZANAFLEX) 4 MG Tab Take 0.5-1 Tablets by mouth at bedtime as needed (muscle spasms). (Patient not taking: Reported on 11/20/2024) 30 Tablet 2    gabapentin (NEURONTIN) 300 MG Cap Take 600mg in the morning, 600mg in the afternoon, and 900mg at night for 1 week. Then increase to 600mg in the morning, 900mg in the afternoon, and 900mg at night for 1 week. Then increase to 900mg three times per day (Patient taking differently: Take 600mg in the morning, 600mg in the afternoon, and 900mg at night for 1 week. Then increase to 600mg in the morning, 900mg in the afternoon, and 900mg at night for 1 week. Then increase to 900mg three times per day    - Pt states he is taking 900 mg 3 times a day) 150 Capsule 2    metformin (GLUCOPHAGE) 1000 MG tablet Take 1 Tablet by mouth 2 times a day with meals. 60 Tablet 2    Continuous Glucose Sensor (DEXCOM G7 SENSOR) Misc 1 Each every 10 days for 180 days. (Patient not taking: Reported on 11/5/2024) 9 Each 1    Continuous Glucose  (DEXCOM G7 ) Device 1 Each Once Every 12 Months. (Patient not taking: Reported on 11/5/2024) 1 Each 0    meloxicam (MOBIC) 15 MG tablet Take 1 Tablet by mouth every day. (Patient not taking: Reported on  "9/10/2024) 30 Tablet 1    metFORMIN (GLUCOPHAGE) 500 MG Tab Take 1 Tablet by mouth 2 times a day with meals. (Patient not taking: Reported on 11/20/2024) 60 Tablet 0    Blood Glucose Meter Kit Test blood sugar as recommended by provider.  Per insurance blood glucose monitoring kit. (Patient not taking: Reported on 11/20/2024) 1 Kit 0    Blood Glucose Test Strips Use one per insurance strip to test blood sugar once daily early morning before first meal. (Patient not taking: Reported on 11/20/2024) 100 Strip 0    Lancets Use one per insurance lancet to test blood sugar once daily early morning before first meal. (Patient not taking: Reported on 11/20/2024) 100 Each 0    Alcohol Swabs Wipe site with prep pad prior to injection. (Patient not taking: Reported on 11/20/2024) 100 Each 0    Insulin Syringes U-100 0.3 mL Use one syringe to inject insulin once daily. (Patient not taking: Reported on 11/20/2024) 100 Each 0     No current facility-administered medications on file prior to visit.         EXAMINATION     Physical Exam:   BP (!) 132/91 (BP Location: Right arm, Patient Position: Sitting, BP Cuff Size: Adult)   Pulse (!) 114   Temp 36.6 °C (97.8 °F) (Temporal)   Ht 1.57 m (5' 1.81\")   Wt 43.5 kg (95 lb 14.4 oz)   SpO2 97%     Constitutional:   Body Habitus: Body mass index is 17.65 kg/m².  Cooperation: Fully cooperates with exam  Appearance: Well-groomed, well-nourished.    Eyes: No scleral icterus to suggest severe liver disease, no proptosis to suggest severe hyperthyroidism    ENT -no obvious auditory deficits, no noticeable facial droop     Skin -no rashes or lesions noted     Respiratory-  breathing comfortably on room air, no audible wheezing    Cardiovascular-distal extremities warm and well perfused.  No lower extremity edema is noted.     Gastrointestinal - no obvious abdominal masses, non-distended    Psychiatric- alert and oriented ×3. Normal affect.     Gait -stable gait     Musculoskeletal and " Neuro -      Thoracic/Lumbar Spine/Sacral Spine/Hips   Inspection: No evidence of atrophy in bilateral lower extremities throughout         Key points for the international standards for neurological classification of spinal cord injury (ISNCSCI) to light touch.   Dermatome R L   L2 2 2   L3 2 2   L4 1 distal from knee 1 distal from knee   L5 1 distal from knee 1 distal from knee   S1 1 distal from knee 1 distal from knee   S2 2 2         Motor Exam Lower Extremities  ? Myotome R L   Hip flexion L2 4+ 4+   Knee extension L3 4+ 4+   Ankle dorsiflexion L4 5 5   Toe extension L5 5 5   Ankle plantarflexion S1 5 5       previous exam  There is limited active range of motion with lumbar extension    Facet loading maneuver positive on left, negative on right     Palpation:   Tenderness to palpation over the lumbar facets bilaterally spanning approximately L1-L5 levels. No tenderness to palpation at midline of lumbosacral spine.     Lumbar spine /hip provocative exam maneuvers  Straight leg raise negative bilaterally  FADIR test negative bilaterally     SI joint tests  LOREE test negative bilaterally      Reflexes  ?   R L   Patella   2+ 2+   Achilles    2+ 2+      Clonus of the ankle negative bilaterally              MEDICAL DECISION MAKING    Medical records review: see under HPI section.     DATA    Labs: Personally reviewed at today's visit:     Lab Results   Component Value Date/Time    SODIUM 139 10/22/2024 11:34 AM    POTASSIUM 4.0 10/22/2024 11:34 AM    CHLORIDE 99 10/22/2024 11:34 AM    CO2 26 10/22/2024 11:34 AM    ANION 14.0 10/22/2024 11:34 AM    GLUCOSE 366 (H) 10/22/2024 11:34 AM    BUN 6 (L) 10/22/2024 11:34 AM    CREATININE 0.74 10/22/2024 11:34 AM    CALCIUM 10.4 10/22/2024 11:34 AM    ASTSGOT 16 10/22/2024 11:34 AM    ALTSGPT 18 10/22/2024 11:34 AM    TBILIRUBIN 0.3 10/22/2024 11:34 AM    ALBUMIN 4.39 11/11/2024 09:11 AM    TOTPROTEIN 7.0 11/11/2024 09:11 AM    GLOBULIN 2.9 10/22/2024 11:34 AM    AGRATIO  1.5 10/22/2024 11:34 AM       Lab Results   Component Value Date/Time    PROTHROMBTM 12.8 12/08/2020 03:32 PM    INR 0.94 12/08/2020 03:32 PM        Lab Results   Component Value Date/Time    WBC 8.2 07/19/2024 07:17 AM    RBC 5.02 07/19/2024 07:17 AM    HEMOGLOBIN 15.8 07/19/2024 07:17 AM    HEMATOCRIT 43.9 07/19/2024 07:17 AM    MCV 87.5 07/19/2024 07:17 AM    MCH 31.5 07/19/2024 07:17 AM    MCHC 36.0 07/19/2024 07:17 AM    MPV 8.9 (L) 07/19/2024 07:17 AM    NEUTSPOLYS 57.70 07/19/2024 07:17 AM    LYMPHOCYTES 29.40 07/19/2024 07:17 AM    MONOCYTES 9.00 07/19/2024 07:17 AM    EOSINOPHILS 2.80 07/19/2024 07:17 AM    BASOPHILS 0.90 07/19/2024 07:17 AM        Lab Results   Component Value Date/Time    HBA1C 7.8 (H) 10/22/2024 11:34 AM      Dr. Ho EMG Examination Date: 9/6/2024      Impression:       This is  an abnormal and a technically limited study due to patient tolerance with needle exam.    Findings suggestive of a length-dependent peripheral neuropathy of the lower extremities.    Unable to rule out radiculopathy or plexopathy.  Findings suggestive of right fibular neuropathy with significantly decreased amplitude compared to the left .   Less likely mononeuropathy of the left fibular and bilateral tibial nerves     Imaging:   I personally reviewed following images, these are my reads   MRI lumbar spine 9/26/2024  No significant central canal stenosis or neuroforaminal stenosis. Grade 1 anterolisthesis of L5 on S1 with bilateral L5 pars defects. See formal radiology report for further details.      IMAGING radiology reads. I reviewed the following radiology reads     Results for orders placed during the hospital encounter of 08/10/10    MR-BRAIN-WITH & W/O    Results for orders placed during the hospital encounter of 12/17/13    MR-BRAIN-W/O    Impression  1.  Benign-appearing 12 mm raised lesion over the left parietal scalp with no significant change since 2010.  2.  Otherwise, unremarkable MRI of the  brain with no significant change since 8/10/2010.        INTERPRETING LOCATION:  [address], [place], [address]                                                            Results for orders placed during the hospital encounter of 13    DX-CERVICAL SPINE-2 OR 3 VIEWS    Impression  Moderate C5-6 and C6-7 degenerative disc disease and slight probably chronic C4 anterolisthesis.            INTERPRETING LOCATION: [address], [place], [address]            Results for orders placed during the hospital encounter of 06/15/24    DX-FOOT-COMPLETE 3+ RIGHT    Impression  1.  Right mid foot and probably 1st metatarsophalangeal joint osteoarthritis    2.  Small vessel atherosclerotic plaque               Results for orders placed during the hospital encounter of 24    DX-LUMBAR SPINE-2 OR 3 VIEWS    Impression  1.  Transitional thoracolumbar and lumbosacral vertebrae.    2.  Grade 1 L5 anterolisthesis and bilateral L5 pars defects similar to previous.    3.  Multilevel degenerative disease mildly increased compared to previous.                         Diagnosis  Visit Diagnoses     ICD-10-CM   1. Diabetic polyneuropathy associated with type 2 diabetes mellitus (HCC)  E11.42   2. Numbness and tingling of both legs  R20.0    R20.2   3. Other chronic pain  G89.29   4. Anterolisthesis of L5 on S1  M43.17   5. Neuropathic pain  M79.2   6. Type 2 diabetes mellitus with hyperglycemia, without long-term current use of insulin (HCC)  E11.65               ASSESSMENT AND PLAN:  Singh Malik (: 1965) is a male with      Singh was seen today for follow-up.    Diagnoses and all orders for this visit:    Diabetic polyneuropathy associated with type 2 diabetes mellitus (HCC)    Numbness and tingling of both legs    Other chronic pain    Anterolisthesis of L5 on S1    Neuropathic pain    Type 2 diabetes mellitus with hyperglycemia, without long-term current use of insulin (HCC)    Other orders  -      nortriptyline (PAMELOR) 10 MG Cap; Take 3 Capsules by mouth every evening for 7 days, THEN 4 Capsules every evening for 7 days, THEN 5 Capsules every evening for 99 days.          Personally reviewed at today's visit:   Labs 11/11/2024 as noted above      PLAN  Diagnostic workup: Appreciate neurology care     Medications:   - increase nortriptyline to 50mg QHS gradually, in increments of 10 mg weekly, given neuropathic pain interfering with sleep. Discussed anticholinergic SE.  - continue gabapentin 900mg TID   -Continue Cymbalta as started by neurology which is helping     Interventions:   - I have discussed that I do not think an epidural steroid injection would significantly improve his symptoms at this time, given that I do not see any significant nerve impingement on his MRI that would correlate with his symptoms.  - I have discussed spinal cord stimulator to target diabetic peripheral neuropathy if medication adjustments are inadequate to control his pain     Other  -Continue follow-up with endocrinology Dr. Gabriel Payton   - letter written for patient to return to work full-time with no restrictions    Follow-up: In 4 weeks to assess progress with medication up titration and consider updated work note    Orders Placed This Encounter    nortriptyline (PAMELOR) 10 MG Cap       Rosa Ronquillo MD  Interventional Pain and Spine  Physical Medicine and Rehabilitation  [facility]      The above note documents my personal evaluation of this patient. In addition, I have reviewed and confirmed with the patient and MA the supportive information documented in today's Patient Health Questionnaire and Office Note.     Please note that this dictation was created using voice recognition software. I have made every reasonable attempt to correct obvious errors, but I expect that there are errors of grammar and possibly content that I did not discover before finalizing the note.

## 2024-11-21 ENCOUNTER — HOSPITAL ENCOUNTER (OUTPATIENT)
Dept: RADIOLOGY | Facility: MEDICAL CENTER | Age: 59
End: 2024-11-21
Attending: SPECIALIST
Payer: COMMERCIAL

## 2024-11-21 DIAGNOSIS — M54.2 NECK PAIN: ICD-10-CM

## 2024-11-21 PROCEDURE — 72141 MRI NECK SPINE W/O DYE: CPT

## 2024-11-22 RX ORDER — GABAPENTIN 300 MG/1
CAPSULE ORAL
Qty: 150 CAPSULE | Refills: 0 | Status: SHIPPED | OUTPATIENT
Start: 2024-11-22

## 2024-11-22 NOTE — TELEPHONE ENCOUNTER
Gabapentin Oral Capsule 300 MG    Received request via: Pharmacy    Was the patient seen in the last year in this department? Yes    Does the patient have an active prescription (recently filled or refills available) for medication(s) requested?  yes    Pharmacy Name: [facility]    Does the patient have [facility] and need 100-day supply? (This applies to ALL medications) Patient does not have SCP  
23-Nov-2021 04:08

## 2024-12-10 ENCOUNTER — OFFICE VISIT (OUTPATIENT)
Dept: ENDOCRINOLOGY | Facility: MEDICAL CENTER | Age: 59
End: 2024-12-10
Attending: INTERNAL MEDICINE
Payer: COMMERCIAL

## 2024-12-10 VITALS
HEART RATE: 113 BPM | SYSTOLIC BLOOD PRESSURE: 140 MMHG | OXYGEN SATURATION: 99 % | DIASTOLIC BLOOD PRESSURE: 84 MMHG | BODY MASS INDEX: 18.77 KG/M2 | WEIGHT: 102 LBS

## 2024-12-10 DIAGNOSIS — M79.2 NEUROPATHIC PAIN: ICD-10-CM

## 2024-12-10 DIAGNOSIS — E11.65 TYPE 2 DIABETES MELLITUS WITH HYPERGLYCEMIA, WITHOUT LONG-TERM CURRENT USE OF INSULIN (HCC): ICD-10-CM

## 2024-12-10 PROCEDURE — 99211 OFF/OP EST MAY X REQ PHY/QHP: CPT | Performed by: INTERNAL MEDICINE

## 2024-12-10 PROCEDURE — 3079F DIAST BP 80-89 MM HG: CPT | Performed by: INTERNAL MEDICINE

## 2024-12-10 PROCEDURE — 3077F SYST BP >= 140 MM HG: CPT | Performed by: INTERNAL MEDICINE

## 2024-12-10 PROCEDURE — 99214 OFFICE O/P EST MOD 30 MIN: CPT | Performed by: INTERNAL MEDICINE

## 2024-12-10 RX ORDER — GABAPENTIN 300 MG/1
900 CAPSULE ORAL 3 TIMES DAILY
Qty: 180 CAPSULE | Refills: 2 | Status: SHIPPED | OUTPATIENT
Start: 2024-12-10 | End: 2024-12-20 | Stop reason: SDUPTHER

## 2024-12-10 ASSESSMENT — FIBROSIS 4 INDEX: FIB4 SCORE: 0.68

## 2024-12-10 NOTE — TELEPHONE ENCOUNTER
Received request via: Patient    Was the patient seen in the last year in this department? Yes    Does the patient have an active prescription (recently filled or refills available) for medication(s) requested?  yes    Pharmacy Name: [email or web address]    Does the patient have [facility] and need 100-day supply? (This applies to ALL medications) Patient does not have SCP    Patient came in to the office and is requesting refill for rx. He states that the current rx is only enough for a short period of time. He states that  he has been taking 900mg TID.

## 2024-12-12 ENCOUNTER — TELEPHONE (OUTPATIENT)
Dept: ENDOCRINOLOGY | Facility: MEDICAL CENTER | Age: 59
End: 2024-12-12
Payer: COMMERCIAL

## 2024-12-12 NOTE — TELEPHONE ENCOUNTER
Called and spoke with Tri (pharmacy tech) she confirmed the script for metformin was not picked up within 7 days so they placed the medications back. And the dexcom was requiring a prior auth.     She re-ran both metformin and dexcom script and both were cleared and no prior auth needed. She will begin processing it and will be ready for  tomorrow after 2:30 pm.    Informed sara (SVEN moe) and she will call to inform pt.

## 2024-12-12 NOTE — TELEPHONE ENCOUNTER
Called and spoke to pt's sister and let her know that Metformin and Dexcom were both available for pickup in 24 hours. Also made her aware that if medication isn't picked up in 7 days then it will get sent back. Pt stated that she would pick it up tomorrow

## 2024-12-14 NOTE — PROGRESS NOTES
Established Patient    Patient Care Team:  ROMAN Kyle as PCP - General (Nurse Practitioner Family)    Singh Malik is a 59 y.o. male who presents today with the following Chief Complaint(s): Follow up for Diagnoses of Type 2 diabetes mellitus with hyperglycemia, without long-term current use of insulin (HCC) and Neuropathic pain were pertinent to this visit.    HPI:  Patient is 59-year-old male with type 2 diabetes, severe diabetic polyneuropathy, weight loss, and dyslipidemia.  Patient was diagnosed with type 2 diabetes in approximately 2019 following a bout with COVID.  Patient reports that his mother sister and uncle all have type 2 diabetes and passed away as a result of it.  Patient was originally started on metformin for a few months, but following that his labs were all reassessed and he was told he did not need to be on anything and metformin was discontinued (this was approximately in 2019)  Of note patient's A1c in June 2024 was 16.5  A1c on 10/15/2024 is 7.5%  Patient reports that he began having diabetic retinopathy since ~7/24  He has seen a neurologist and a presumptive diagnosis is that his neuropathy is secondary to his diabetes despite his very recent diagnosis.  His prior weight to COVID was approximately 152 pounds, but he now weighs 95 pounds and his BMI is approximately 50.  He reports that this is secondary to him simply feeling less hungry.  Patient does have some diarrhea but denies vomiting.  He does report eating less.  He has been treated with gabapentin and he is doing somewhat better on that medication  He checks his sugars and reports that he does not get lows and his sugars are rarely in the 200s    10/2024 CHRISTINA ab <5.0, islet cell ab <1.0, A1c 7.8%  He has not picked up the dexcom yet    ROS:  Per HPI, otherwise: Negative    Past Medical History:   Diagnosis Date    Pneumonia due to COVID-19 virus 12/8/2020     Social History     Tobacco Use    Smoking  status: Never    Smokeless tobacco: Never   Vaping Use    Vaping status: Never Used   Substance Use Topics    Alcohol use: No    Drug use: No     Current Outpatient Medications   Medication Sig Dispense Refill    DULoxetine (CYMBALTA) 30 MG Cap DR Particles Take 1 Capsule by mouth every day. 30 Capsule 11    tizanidine (ZANAFLEX) 4 MG Tab Take 0.5-1 Tablets by mouth at bedtime as needed (muscle spasms). (Patient not taking: Reported on 11/20/2024) 30 Tablet 2    gabapentin (NEURONTIN) 300 MG Cap Take 3 Capsules by mouth 3 times a day. 180 Capsule 2    metformin (GLUCOPHAGE) 1000 MG tablet Take 1 Tablet by mouth 2 times a day with meals for 270 days. 180 Tablet 2    nortriptyline (PAMELOR) 10 MG Cap Take 3 Capsules by mouth every evening for 7 days, THEN 4 Capsules every evening for 7 days, THEN 5 Capsules every evening for 99 days. 160 Capsule 2    Continuous Glucose Sensor (DEXCOM G7 SENSOR) Misc 1 Each every 10 days for 180 days. (Patient not taking: Reported on 11/5/2024) 9 Each 1    Continuous Glucose  (DEXCOM G7 ) Device 1 Each Once Every 12 Months. (Patient not taking: Reported on 11/5/2024) 1 Each 0    metformin (GLUCOPHAGE) 1000 MG tablet Take 1 Tablet by mouth 2 times a day with meals. 60 Tablet 2    meloxicam (MOBIC) 15 MG tablet Take 1 Tablet by mouth every day. (Patient not taking: Reported on 9/10/2024) 30 Tablet 1    Blood Glucose Meter Kit Test blood sugar as recommended by provider.  Per insurance blood glucose monitoring kit. (Patient not taking: Reported on 11/20/2024) 1 Kit 0    Blood Glucose Test Strips Use one per insurance strip to test blood sugar once daily early morning before first meal. (Patient not taking: Reported on 11/20/2024) 100 Strip 0    Lancets Use one per insurance lancet to test blood sugar once daily early morning before first meal. (Patient not taking: Reported on 11/20/2024) 100 Each 0    Alcohol Swabs Wipe site with prep pad prior to injection. (Patient not  taking: Reported on 11/20/2024) 100 Each 0    Insulin Syringes U-100 0.3 mL Use one syringe to inject insulin once daily. (Patient not taking: Reported on 11/20/2024) 100 Each 0     No current facility-administered medications for this visit.       BP (!) 140/84   Pulse (!) 113   Wt 46.3 kg (102 lb)   SpO2 99%   BMI 18.77 kg/m²     Physical Exam:   Gen:           Alert and oriented, No apparent distress. Mood and affect appropriate, normal interaction with examiner.   Hearing:     Grossly intact.  Nose:          Normal, no lesions or deformities.  Gait and Station: Uses a cane secondary to neuropathy       Assessment and Plan:     1. Type 2 diabetes mellitus with hyperglycemia, without long-term current use of insulin (HCC)  Continue current medications  Patient to  Dexcom so that we can have a better sense of what is happening with his sugars and subsequently treat them more appropriately  His A1c is 7.8% which suggests he is not loudly out of control, particularly since he is not having lows, but the rapid progression of his diabetic neuropathy is concerning and to try to control his blood sugars as quickly and completely as we can  Follow-up in 2 months with Dexcom so that we may review his sugars    2. Neuropathic pain  He is following with neurology and PMNR and his reported neuropathic pain is slightly improved to the point that he has been able to return to work, though he continues to use a cane and report significant pain      No orders of the defined types were placed in this encounter.      No follow-ups on file.    Please note that this dictation was created using voice recognition software. I have made every reasonable attempt to correct obvious errors, but I expect that there are errors of grammar and possibly content that I did not discover before finalizing the note.     Gabriel Payton M.D.

## 2024-12-18 DIAGNOSIS — G62.9 NEUROPATHY: ICD-10-CM

## 2024-12-18 DIAGNOSIS — G62.9 NEUROPATHY: Primary | ICD-10-CM

## 2024-12-18 RX ORDER — DULOXETIN HYDROCHLORIDE 30 MG/1
30 CAPSULE, DELAYED RELEASE ORAL DAILY
Qty: 30 CAPSULE | Refills: 11 | Status: SHIPPED | OUTPATIENT
Start: 2024-12-18

## 2024-12-19 ENCOUNTER — APPOINTMENT (OUTPATIENT)
Dept: NEUROLOGY | Facility: MEDICAL CENTER | Age: 59
End: 2024-12-19
Attending: SPECIALIST
Payer: COMMERCIAL

## 2024-12-20 ENCOUNTER — OFFICE VISIT (OUTPATIENT)
Dept: PHYSICAL MEDICINE AND REHAB | Facility: MEDICAL CENTER | Age: 59
End: 2024-12-20
Payer: COMMERCIAL

## 2024-12-20 VITALS
SYSTOLIC BLOOD PRESSURE: 118 MMHG | WEIGHT: 106.26 LBS | OXYGEN SATURATION: 100 % | HEIGHT: 62 IN | DIASTOLIC BLOOD PRESSURE: 72 MMHG | TEMPERATURE: 98 F | BODY MASS INDEX: 19.55 KG/M2 | HEART RATE: 102 BPM

## 2024-12-20 DIAGNOSIS — G89.29 CHRONIC PAIN OF LEFT ANKLE: ICD-10-CM

## 2024-12-20 DIAGNOSIS — R20.2 NUMBNESS AND TINGLING OF BOTH LEGS: ICD-10-CM

## 2024-12-20 DIAGNOSIS — R60.0 EDEMA OF LEFT LOWER LEG: ICD-10-CM

## 2024-12-20 DIAGNOSIS — E11.65 TYPE 2 DIABETES MELLITUS WITH HYPERGLYCEMIA, WITHOUT LONG-TERM CURRENT USE OF INSULIN (HCC): ICD-10-CM

## 2024-12-20 DIAGNOSIS — G89.29 OTHER CHRONIC PAIN: ICD-10-CM

## 2024-12-20 DIAGNOSIS — M79.2 NEUROPATHIC PAIN: ICD-10-CM

## 2024-12-20 DIAGNOSIS — R20.0 NUMBNESS AND TINGLING OF BOTH LEGS: ICD-10-CM

## 2024-12-20 DIAGNOSIS — M43.17 ANTEROLISTHESIS OF LUMBOSACRAL SPINE: ICD-10-CM

## 2024-12-20 DIAGNOSIS — E11.42 DIABETIC POLYNEUROPATHY ASSOCIATED WITH TYPE 2 DIABETES MELLITUS (HCC): ICD-10-CM

## 2024-12-20 DIAGNOSIS — M25.572 CHRONIC PAIN OF LEFT ANKLE: ICD-10-CM

## 2024-12-20 PROCEDURE — 99214 OFFICE O/P EST MOD 30 MIN: CPT | Performed by: STUDENT IN AN ORGANIZED HEALTH CARE EDUCATION/TRAINING PROGRAM

## 2024-12-20 PROCEDURE — 3074F SYST BP LT 130 MM HG: CPT | Performed by: STUDENT IN AN ORGANIZED HEALTH CARE EDUCATION/TRAINING PROGRAM

## 2024-12-20 PROCEDURE — 3078F DIAST BP <80 MM HG: CPT | Performed by: STUDENT IN AN ORGANIZED HEALTH CARE EDUCATION/TRAINING PROGRAM

## 2024-12-20 PROCEDURE — 1125F AMNT PAIN NOTED PAIN PRSNT: CPT | Performed by: STUDENT IN AN ORGANIZED HEALTH CARE EDUCATION/TRAINING PROGRAM

## 2024-12-20 RX ORDER — NORTRIPTYLINE HYDROCHLORIDE 50 MG/1
50 CAPSULE ORAL NIGHTLY
Qty: 30 CAPSULE | Refills: 2 | Status: SHIPPED | OUTPATIENT
Start: 2024-12-20

## 2024-12-20 RX ORDER — GABAPENTIN 300 MG/1
900 CAPSULE ORAL 3 TIMES DAILY
Qty: 180 CAPSULE | Refills: 2 | Status: SHIPPED | OUTPATIENT
Start: 2024-12-20

## 2024-12-20 ASSESSMENT — FIBROSIS 4 INDEX: FIB4 SCORE: 0.68

## 2024-12-20 ASSESSMENT — PAIN SCALES - GENERAL: PAINLEVEL_OUTOF10: 6=MODERATE PAIN

## 2024-12-20 NOTE — Clinical Note
Andre Live,  I saw Singh today.  Thankfully his neuropathic pain is much improved with our medication regimen.  He had pitting edema of the left lower extremity with tenderness to palpation and overlying redness today.  I recommended that he go to the ED to rule out DVT.    Of note, I know that he is working with endocrinology in regards to control of his diabetes, however I wonder if a CT abdomen would be helpful to rule out any contribution of his pancreas to his very labile A1c numbers, rapid weight loss, and diminished appetite.  I do not have much expertise in this area, so I would defer to your judgment.  Just wanted to put it out there in case you think that it might be helpful.   Happy holidays!  Thanks, Rosa

## 2024-12-20 NOTE — PROGRESS NOTES
Verbal consent was acquired by the patient to use [employer] ambient listening note generation during this visit Yes      services were used in the patient's primary language of Estonian.     Name or Number: Sheba  Mode of interpretation: iPad    Content of Interpretation:    Follow-up patient Note    Interventional Pain and Spine  Physiatry (Physical Medicine and Rehabilitation)     Patient Name: Singh Malik  : 1965  Date of service: 2024    Chief Complaint:   Chief Complaint   Patient presents with    Follow-Up     1m fv       HISTORY  Please see new patient note by Dr. Ronquillo for more details.     HPI  Today's visit   Singh Malik ( 1965) is a male with Diagnoses of Diabetic polyneuropathy associated with type 2 diabetes mellitus (HCC), Numbness and tingling of both legs, Other chronic pain, Anterolisthesis of L5 on S1, Type 2 diabetes mellitus with hyperglycemia, without long-term current use of insulin (HCC), Neuropathic pain, Edema of left lower leg, and Chronic pain of left ankle were pertinent to this visit.    Notes swelling of left calf since he started working about 11/15. No swelling of right calf. Pain with palpation of left calf.      History of Present Illness  The patient is a 59-year-old male who presents for a follow-up visit.     He reports a 35% improvement in neuropathic pain. However, he experiences swelling in her left ankle, foot, and knee, which he initially noticed when returning to work on 11/15.  He denies significant swelling of the right calf.  He has pain with palpation of the left calf as well as color changes.      He is currently taking nortriptyline 50 mg nightly with some improvement in sleep quality.  He continues taking gabapentin and Cymbalta at the previous dose.        ROS:   Red Flags ROS:   Fever, Chills, Sweats: Denies  Involuntary Weight Loss: Denies  Bladder Incontinence: Denies  Bowel  Incontinence: denies  Saddle Anesthesia: Denies    All other systems reviewed and negative.     PMHx:   Past Medical History:   Diagnosis Date    Pneumonia due to COVID-19 virus 12/8/2020       PSHx:   History reviewed. No pertinent surgical history.    Family Hx:   Family History   Problem Relation Age of Onset    Diabetes Mother     Cancer Mother         Melanoma    Stroke Father     Breast Cancer Sister     Heart Disease Neg Hx     Hyperlipidemia Neg Hx     Hypertension Neg Hx        Social Hx:  Social History     Socioeconomic History    Marital status: Single     Spouse name: Not on file    Number of children: Not on file    Years of education: Not on file    Highest education level: Not on file   Occupational History    Not on file   Tobacco Use    Smoking status: Never    Smokeless tobacco: Never   Vaping Use    Vaping status: Never Used   Substance and Sexual Activity    Alcohol use: No    Drug use: No    Sexual activity: Not Currently   Other Topics Concern    Not on file   Social History Narrative    Not on file     Social Drivers of Health     Financial Resource Strain: Not on file   Food Insecurity: Not on file   Transportation Needs: Not on file   Physical Activity: Not on file   Stress: Not on file   Social Connections: Not on file   Intimate Partner Violence: Not on file   Housing Stability: Not on file       Allergies:  No Known Allergies    Medications: reviewed on epic.   Outpatient Medications Marked as Taking for the 12/20/24 encounter (Office Visit) with Rosa Ronquillo M.D.   Medication Sig Dispense Refill    gabapentin (NEURONTIN) 300 MG Cap Take 3 Capsules by mouth 3 times a day. 180 Capsule 2    nortriptyline (PAMELOR) 50 MG capsule Take 1 Capsule by mouth every evening. 30 Capsule 2    DULoxetine (CYMBALTA) 30 MG Cap DR Particles Take 1 Capsule by mouth every day. 30 Capsule 11    DULoxetine (CYMBALTA) 30 MG Cap DR Particles Take 1 Capsule by mouth every day. 30 Capsule 11    metformin  (GLUCOPHAGE) 1000 MG tablet Take 1 Tablet by mouth 2 times a day with meals for 270 days. 180 Tablet 2    metformin (GLUCOPHAGE) 1000 MG tablet Take 1 Tablet by mouth 2 times a day with meals. 60 Tablet 2        Current Outpatient Medications on File Prior to Visit   Medication Sig Dispense Refill    DULoxetine (CYMBALTA) 30 MG Cap DR Particles Take 1 Capsule by mouth every day. 30 Capsule 11    DULoxetine (CYMBALTA) 30 MG Cap DR Particles Take 1 Capsule by mouth every day. 30 Capsule 11    metformin (GLUCOPHAGE) 1000 MG tablet Take 1 Tablet by mouth 2 times a day with meals for 270 days. 180 Tablet 2    tizanidine (ZANAFLEX) 4 MG Tab Take 0.5-1 Tablets by mouth at bedtime as needed (muscle spasms). (Patient not taking: Reported on 11/11/2024) 30 Tablet 2    metformin (GLUCOPHAGE) 1000 MG tablet Take 1 Tablet by mouth 2 times a day with meals. 60 Tablet 2    Continuous Glucose Sensor (DEXCOM G7 SENSOR) Misc 1 Each every 10 days for 180 days. (Patient not taking: Reported on 12/20/2024) 9 Each 1    Continuous Glucose  (DEXCOM G7 ) Device 1 Each Once Every 12 Months. (Patient not taking: Reported on 12/20/2024) 1 Each 0    meloxicam (MOBIC) 15 MG tablet Take 1 Tablet by mouth every day. (Patient not taking: Reported on 12/20/2024) 30 Tablet 1    Blood Glucose Meter Kit Test blood sugar as recommended by provider.  Per insurance blood glucose monitoring kit. (Patient not taking: Reported on 10/15/2024) 1 Kit 0    Blood Glucose Test Strips Use one per insurance strip to test blood sugar once daily early morning before first meal. (Patient not taking: Reported on 10/15/2024) 100 Strip 0    Lancets Use one per insurance lancet to test blood sugar once daily early morning before first meal. (Patient not taking: Reported on 8/6/2024) 100 Each 0    Alcohol Swabs Wipe site with prep pad prior to injection. (Patient not taking: Reported on 10/15/2024) 100 Each 0    Insulin Syringes U-100 0.3 mL Use one syringe  "to inject insulin once daily. (Patient not taking: Reported on 6/15/2024) 100 Each 0     No current facility-administered medications on file prior to visit.         EXAMINATION     Physical Exam:   /72 (BP Location: Right arm, Patient Position: Sitting, BP Cuff Size: Adult)   Pulse (!) 102   Temp 36.7 °C (98 °F) (Temporal)   Ht 1.57 m (5' 1.81\")   Wt 48.2 kg (106 lb 4.2 oz)   SpO2 100%     Constitutional:   Body Habitus: Body mass index is 19.55 kg/m².  Cooperation: Fully cooperates with exam  Appearance: Well-groomed, well-nourished.    Eyes: No scleral icterus to suggest severe liver disease, no proptosis to suggest severe hyperthyroidism    ENT -no obvious auditory deficits, no noticeable facial droop     Skin -no rashes or lesions noted     Respiratory-  breathing comfortably on room air, no audible wheezing    Cardiovascular-distal extremities warm and well perfused.  Pitting edema in left calf and left ankle with tenderness to palpation at this area.  Skin is more red at this area.  Negative Homans on the left.    Gastrointestinal - no obvious abdominal masses, non-distended    Psychiatric- alert and oriented ×3. Normal affect.     Gait -stable gait     Musculoskeletal and Neuro -      Thoracic/Lumbar Spine/Sacral Spine/Hips   Inspection: No evidence of atrophy in bilateral lower extremities throughout      Key points for the international standards for neurological classification of spinal cord injury (ISNCSCI) to light touch.   Dermatome R L   L2 2 2   L3 2 2   L4 1 distal from knee 1 distal from knee   L5 1 distal from knee 1 distal from knee   S1 1 distal from knee 1 distal from knee   S2 2 2      Motor Exam Lower Extremities  ? Myotome R L   Hip flexion L2 4+ 4+   Knee extension L3 4+ 4+   Ankle dorsiflexion L4 5 5   Toe extension L5 5 5   Ankle plantarflexion S1 5 5       previous exam  There is limited active range of motion with lumbar extension    Facet loading maneuver positive on left, " negative on right     Palpation:   Tenderness to palpation over the lumbar facets bilaterally spanning approximately L1-L5 levels. No tenderness to palpation at midline of lumbosacral spine.     Lumbar spine /hip provocative exam maneuvers  Straight leg raise negative bilaterally  FADIR test negative bilaterally     SI joint tests  LOREE test negative bilaterally      Reflexes  ?   R L   Patella   2+ 2+   Achilles    2+ 2+      Clonus of the ankle negative bilaterally              MEDICAL DECISION MAKING    Medical records review: see under HPI section.     DATA    Labs: Personally reviewed at today's visit:     Lab Results   Component Value Date/Time    SODIUM 139 10/22/2024 11:34 AM    POTASSIUM 4.0 10/22/2024 11:34 AM    CHLORIDE 99 10/22/2024 11:34 AM    CO2 26 10/22/2024 11:34 AM    ANION 14.0 10/22/2024 11:34 AM    GLUCOSE 366 (H) 10/22/2024 11:34 AM    BUN 6 (L) 10/22/2024 11:34 AM    CREATININE 0.74 10/22/2024 11:34 AM    CALCIUM 10.4 10/22/2024 11:34 AM    ASTSGOT 16 10/22/2024 11:34 AM    ALTSGPT 18 10/22/2024 11:34 AM    TBILIRUBIN 0.3 10/22/2024 11:34 AM    ALBUMIN 4.39 11/11/2024 09:11 AM    TOTPROTEIN 7.0 11/11/2024 09:11 AM    GLOBULIN 2.9 10/22/2024 11:34 AM    AGRATIO 1.5 10/22/2024 11:34 AM       Lab Results   Component Value Date/Time    PROTHROMBTM 12.8 12/08/2020 03:32 PM    INR 0.94 12/08/2020 03:32 PM        Lab Results   Component Value Date/Time    WBC 8.2 07/19/2024 07:17 AM    RBC 5.02 07/19/2024 07:17 AM    HEMOGLOBIN 15.8 07/19/2024 07:17 AM    HEMATOCRIT 43.9 07/19/2024 07:17 AM    MCV 87.5 07/19/2024 07:17 AM    MCH 31.5 07/19/2024 07:17 AM    MCHC 36.0 07/19/2024 07:17 AM    MPV 8.9 (L) 07/19/2024 07:17 AM    NEUTSPOLYS 57.70 07/19/2024 07:17 AM    LYMPHOCYTES 29.40 07/19/2024 07:17 AM    MONOCYTES 9.00 07/19/2024 07:17 AM    EOSINOPHILS 2.80 07/19/2024 07:17 AM    BASOPHILS 0.90 07/19/2024 07:17 AM        Lab Results   Component Value Date/Time    HBA1C 7.8 (H) 10/22/2024 11:34 AM       Dr. Ho EMG Examination Date: 9/6/2024      Impression:       This is  an abnormal and a technically limited study due to patient tolerance with needle exam.    Findings suggestive of a length-dependent peripheral neuropathy of the lower extremities.    Unable to rule out radiculopathy or plexopathy.  Findings suggestive of right fibular neuropathy with significantly decreased amplitude compared to the left .   Less likely mononeuropathy of the left fibular and bilateral tibial nerves     Imaging:   I personally reviewed following images, these are my reads   MRI lumbar spine 9/26/2024  No significant central canal stenosis or neuroforaminal stenosis. Grade 1 anterolisthesis of L5 on S1 with bilateral L5 pars defects. See formal radiology report for further details.      IMAGING radiology reads. I reviewed the following radiology reads     Results for orders placed during the hospital encounter of 08/10/10    MR-BRAIN-WITH & W/O    Results for orders placed during the hospital encounter of 12/17/13    MR-BRAIN-W/O    Impression  1.  Benign-appearing 12 mm raised lesion over the left parietal scalp with no significant change since 2010.  2.  Otherwise, unremarkable MRI of the brain with no significant change since 8/10/2010.        [facility]  [address] [facility] [address]                                                            Results for orders placed during the hospital encounter of 11/14/13    DX-CERVICAL SPINE-2 OR 3 VIEWS    Impression  Moderate C5-6 and C6-7 degenerative disc disease and slight probably chronic C4 anterolisthesis.            [facility] [address] [facility] [id number]            Results for orders placed during the hospital encounter of 06/15/24    DX-FOOT-COMPLETE 3+ RIGHT    Impression  1.  Right mid foot and probably 1st metatarsophalangeal joint osteoarthritis    2.  Small vessel atherosclerotic plaque               Results for orders placed during the hospital  encounter of 24    DX-LUMBAR SPINE-2 OR 3 VIEWS    Impression  1.  Transitional thoracolumbar and lumbosacral vertebrae.    2.  Grade 1 L5 anterolisthesis and bilateral L5 pars defects similar to previous.    3.  Multilevel degenerative disease mildly increased compared to previous.                         Diagnosis  Visit Diagnoses     ICD-10-CM   1. Diabetic polyneuropathy associated with type 2 diabetes mellitus (HCC)  E11.42   2. Numbness and tingling of both legs  R20.0    R20.2   3. Other chronic pain  G89.29   4. Anterolisthesis of L5 on S1  M43.17   5. Type 2 diabetes mellitus with hyperglycemia, without long-term current use of insulin (HCC)  E11.65   6. Neuropathic pain  M79.2   7. Edema of left lower leg  R60.0   8. Chronic pain of left ankle  M25.572    G89.29                 ASSESSMENT AND PLAN:  Singh Malik (: 1965) is a male with      Singh was seen today for follow-up.    Diagnoses and all orders for this visit:    Diabetic polyneuropathy associated with type 2 diabetes mellitus (HCC)    Numbness and tingling of both legs    Other chronic pain    Anterolisthesis of L5 on S1    Type 2 diabetes mellitus with hyperglycemia, without long-term current use of insulin (HCC)    Neuropathic pain    Edema of left lower leg    Chronic pain of left ankle    Other orders  -     gabapentin (NEURONTIN) 300 MG Cap; Take 3 Capsules by mouth 3 times a day.  -     nortriptyline (PAMELOR) 50 MG capsule; Take 1 Capsule by mouth every evening.            Recommend patient present to ED for workup of left LLE swelling including DVT. Neg Homans today but tenderness to palpation at the left calf with overlying red skin at the painful area.    PLAN  Diagnostic workup: Appreciate neurology care     Medications:   - refill nortriptyline 50mg QHS given neuropathic pain interfering with sleep. Discussed anticholinergic SE.  - refill gabapentin 900mg TID.  The patient is currently noticing  improvement in pain on this dose which is not stable.  -Continue Cymbalta as started by neurology which is helping     Interventions:   - I have discussed that I do not think an epidural steroid injection would significantly improve his symptoms at this time, given that I do not see any significant nerve impingement on his MRI that would correlate with his symptoms.  - I have discussed spinal cord stimulator to target diabetic peripheral neuropathy if medication adjustments are inadequate to control his pain     Other  -Continue follow-up with endocrinology Dr. Gabriel Payton   - letter written for patient to return to work full-time with no restrictions  -CC PCP    Follow-up: In 3 mos     Orders Placed This Encounter    gabapentin (NEURONTIN) 300 MG Cap    nortriptyline (PAMELOR) 50 MG capsule       Rosa Ronquillo MD  Interventional Pain and Spine  Physical Medicine and Rehabilitation  [facility]      The above note documents my personal evaluation of this patient. In addition, I have reviewed and confirmed with the patient and MA the supportive information documented in today's Patient Health Questionnaire and Office Note.     Please note that this dictation was created using voice recognition software. I have made every reasonable attempt to correct obvious errors, but I expect that there are errors of grammar and possibly content that I did not discover before finalizing the note.

## 2024-12-24 NOTE — PROGRESS NOTES
Spoke to patient's sister and she stated she would talk to him to make an appointment with Maria T.

## 2025-01-07 ENCOUNTER — TELEPHONE (OUTPATIENT)
Dept: NEUROLOGY | Facility: MEDICAL CENTER | Age: 60
End: 2025-01-07
Payer: COMMERCIAL

## 2025-01-07 NOTE — TELEPHONE ENCOUNTER
Pt needs a refill of cymbalta sent to preferred pharmacy [facility] Pt is out of meds.

## 2025-01-10 DIAGNOSIS — G62.9 NEUROPATHY: Primary | ICD-10-CM

## 2025-01-10 RX ORDER — DULOXETIN HYDROCHLORIDE 30 MG/1
30 CAPSULE, DELAYED RELEASE ORAL DAILY
Qty: 30 CAPSULE | Refills: 11 | Status: SHIPPED | OUTPATIENT
Start: 2025-01-10

## 2025-01-17 ENCOUNTER — TELEPHONE (OUTPATIENT)
Dept: ENDOCRINOLOGY | Facility: MEDICAL CENTER | Age: 60
End: 2025-01-17

## 2025-01-17 NOTE — TELEPHONE ENCOUNTER
Spoke to pt's sister and pt is requesting a refill on Metformin 1000 mg. Please send to [facility] in [place] on file

## 2025-01-19 DIAGNOSIS — E11.65 TYPE 2 DIABETES MELLITUS WITH HYPERGLYCEMIA, WITHOUT LONG-TERM CURRENT USE OF INSULIN (HCC): ICD-10-CM

## 2025-01-23 ENCOUNTER — OFFICE VISIT (OUTPATIENT)
Dept: NEUROLOGY | Facility: MEDICAL CENTER | Age: 60
End: 2025-01-23
Attending: SPECIALIST
Payer: COMMERCIAL

## 2025-01-23 VITALS
DIASTOLIC BLOOD PRESSURE: 82 MMHG | SYSTOLIC BLOOD PRESSURE: 102 MMHG | WEIGHT: 105 LBS | BODY MASS INDEX: 19.83 KG/M2 | HEART RATE: 110 BPM | RESPIRATION RATE: 12 BRPM | OXYGEN SATURATION: 100 % | TEMPERATURE: 98.8 F | HEIGHT: 61 IN

## 2025-01-23 DIAGNOSIS — G62.9 NEUROPATHY: ICD-10-CM

## 2025-01-23 PROCEDURE — 3074F SYST BP LT 130 MM HG: CPT | Performed by: SPECIALIST

## 2025-01-23 PROCEDURE — 99214 OFFICE O/P EST MOD 30 MIN: CPT | Performed by: SPECIALIST

## 2025-01-23 PROCEDURE — 99212 OFFICE O/P EST SF 10 MIN: CPT | Performed by: SPECIALIST

## 2025-01-23 PROCEDURE — 3079F DIAST BP 80-89 MM HG: CPT | Performed by: SPECIALIST

## 2025-01-23 RX ORDER — DULOXETIN HYDROCHLORIDE 60 MG/1
60 CAPSULE, DELAYED RELEASE ORAL DAILY
Qty: 90 CAPSULE | Refills: 3 | Status: SHIPPED | OUTPATIENT
Start: 2025-01-23

## 2025-01-23 RX ORDER — GABAPENTIN 300 MG/1
900 CAPSULE ORAL 3 TIMES DAILY
Qty: 270 CAPSULE | Refills: 11 | Status: SHIPPED | OUTPATIENT
Start: 2025-01-23

## 2025-01-23 ASSESSMENT — PATIENT HEALTH QUESTIONNAIRE - PHQ9
5. POOR APPETITE OR OVEREATING: 2 - MORE THAN HALF THE DAYS
SUM OF ALL RESPONSES TO PHQ QUESTIONS 1-9: 13
CLINICAL INTERPRETATION OF PHQ2 SCORE: 3

## 2025-01-23 ASSESSMENT — FIBROSIS 4 INDEX: FIB4 SCORE: 0.68

## 2025-01-23 NOTE — PROGRESS NOTES
"Subjective     Singh Malik is a 59 y.o. male who presents with Follow-Up (Neuropathy/)            HPI  Mr. Singh Malik is seen back for follow-up with the aid of a video  service.  I saw him on November 11, he described a history of foot numbness and pain for several years.  He was taking gabapentin 1500 mg daily at that time, he states he is up to gabapentin to 900 mg 3 times daily.  I started him on nortriptyline 50 mg at night, he does not feel that that helps.  He was also started on Cymbalta 30 mg at night and he feels that that significantly helps.  He request taking more Cymbalta.  He continues to have pain in his feet which she states is about a 4-5 out of 10 now it was much stronger before.  In addition he states that he has pain in his right ankle and at the end of the day when he works he has swelling in his legs.  His neuropathy workup was negative except for his diabetes, he had a sed rate of 11, he had a normal serum protein electrophoresis.  His glucose on October 22 was 366, his hemoglobin A1c was 7.8 but that was down from 16.2 in June.  Cervical MRI reveals no significant stenosis.    Past medical history:    1.  Type 2 diabetes.    2.  Peripheral neuropathy with numbness and pain diagnosed as a neuropathy on EMG.    Medications-gabapentin 900 mg 3 times daily, Cymbalta 30 mg daily, nortriptyline 50 mg at night, insulin, Glucophage, as needed tizanidine.    Allergies-NKDA    Social history-he is a non-smoker nondrinker.    Family history-positive for diabetes and stroke.    .  ROS  Of, he describes burning pain in his feet likely due to the neuropathy.  He also describes an aching pain in his right ankle and swelling in the legs at the end of the day.         Objective     /82 (BP Location: Left arm, Patient Position: Sitting, BP Cuff Size: Adult)   Pulse (!) 110   Temp 37.1 °C (98.8 °F) (Temporal)   Resp 12   Ht 1.549 m (5' 1\")   Wt 47.6 kg (105 " lb)   SpO2 100%   BMI 19.84 kg/m²      Physical Exam  Patient is a cooperative gentleman.  Speech is fluent in Lao.  He presents a coherent history.    HEENT exam reveals to be normocephalic and atraumatic.  Pupils equal round reactive.  EOMs are full, visual fields are full    His neck is supple.    He stands without difficulty.  His gait is mildly wide-based and he walks with a cane.    Motor testing reveals intact strength throughout.    Sensory testing is intact to pin.  He does perceive vibration in his feet.    Reflexes are 1+ at the arms and knees absent at the ankles with downgoing toes.    Cranial nerves are unremarkable.            Neurological Exam  Mr. Otoniel Malik is a 59-year-old gentleman with a history of type 2 diabetes and foot numbness and burning pain.  He has a neuropathy on EMG.  Workup reveals diabetes is the primary cause of his neuropathy and no other abnormalities were noted.  He does not feel nortriptyline is helping and he will stop the medication.  He does feel that gabapentin is helping and he is taking a higher dose than what he told me previously, if he tolerates that he will continue 900 mg 3 times daily.  He requests upping the Cymbalta and the dose is increased to 60 mg daily.  I will see him back in 4 months and he will have labs before he sees me.  Importance of diabetic control was stressed.           Assessment & Plan        Assessment & Plan  Neuropathy    Orders:    gabapentin (NEURONTIN) 300 MG Cap; Take 3 Capsules by mouth 3 times a day.    Comp Metabolic Panel; Future    VIT B12,  FOLIC ACID    HEMOGLOBIN A1C; Future    DULoxetine (CYMBALTA) 60 MG Cap DR Particles delayed-release capsule; Take 1 Capsule by mouth every day.

## 2025-02-04 ENCOUNTER — TELEPHONE (OUTPATIENT)
Dept: ENDOCRINOLOGY | Facility: MEDICAL CENTER | Age: 60
End: 2025-02-04
Payer: COMMERCIAL

## 2025-02-18 ENCOUNTER — OFFICE VISIT (OUTPATIENT)
Dept: ENDOCRINOLOGY | Facility: MEDICAL CENTER | Age: 60
End: 2025-02-18
Attending: INTERNAL MEDICINE
Payer: COMMERCIAL

## 2025-02-18 VITALS
OXYGEN SATURATION: 99 % | SYSTOLIC BLOOD PRESSURE: 145 MMHG | HEART RATE: 98 BPM | DIASTOLIC BLOOD PRESSURE: 80 MMHG | WEIGHT: 104 LBS | HEIGHT: 62 IN | BODY MASS INDEX: 19.14 KG/M2

## 2025-02-18 DIAGNOSIS — M79.2 NEUROPATHIC PAIN: ICD-10-CM

## 2025-02-18 DIAGNOSIS — E11.65 TYPE 2 DIABETES MELLITUS WITH HYPERGLYCEMIA, WITHOUT LONG-TERM CURRENT USE OF INSULIN (HCC): ICD-10-CM

## 2025-02-18 LAB
HBA1C MFR BLD: 7.6 % (ref ?–5.8)
POCT INT CON NEG: NEGATIVE
POCT INT CON POS: POSITIVE

## 2025-02-18 PROCEDURE — 95250 CONT GLUC MNTR PHYS/QHP EQP: CPT | Performed by: INTERNAL MEDICINE

## 2025-02-18 PROCEDURE — 95251 CONT GLUC MNTR ANALYSIS I&R: CPT | Performed by: INTERNAL MEDICINE

## 2025-02-18 PROCEDURE — 99214 OFFICE O/P EST MOD 30 MIN: CPT | Performed by: INTERNAL MEDICINE

## 2025-02-18 PROCEDURE — 3079F DIAST BP 80-89 MM HG: CPT | Performed by: INTERNAL MEDICINE

## 2025-02-18 PROCEDURE — 99213 OFFICE O/P EST LOW 20 MIN: CPT | Performed by: INTERNAL MEDICINE

## 2025-02-18 PROCEDURE — 83036 HEMOGLOBIN GLYCOSYLATED A1C: CPT | Performed by: INTERNAL MEDICINE

## 2025-02-18 PROCEDURE — 3077F SYST BP >= 140 MM HG: CPT | Performed by: INTERNAL MEDICINE

## 2025-02-18 ASSESSMENT — FIBROSIS 4 INDEX: FIB4 SCORE: 0.68

## 2025-02-23 RX ORDER — ORAL SEMAGLUTIDE 7 MG/1
7 TABLET ORAL DAILY
Qty: 100 TABLET | Refills: 0 | Status: SHIPPED | OUTPATIENT
Start: 2025-02-23 | End: 2025-06-03

## 2025-02-23 RX ORDER — ORAL SEMAGLUTIDE 3 MG/1
3 TABLET ORAL DAILY
Qty: 30 TABLET | Refills: 0 | Status: SHIPPED | OUTPATIENT
Start: 2025-02-23 | End: 2026-02-23

## 2025-02-23 NOTE — PROGRESS NOTES
Established Patient    Patient Care Team:  ROMAN Kyle as PCP - General (Nurse Practitioner Family)    Singh Malik is a 59 y.o. male who presents today with the following Chief Complaint(s): Follow up for Diagnoses of Type 2 diabetes mellitus with hyperglycemia, without long-term current use of insulin (HCC) and Neuropathic pain were pertinent to this visit.    HPI:  Patient is 59-year-old male with type 2 diabetes, severe diabetic polyneuropathy, weight loss, and dyslipidemia.  Patient was diagnosed with type 2 diabetes in approximately 2019 following a bout with COVID.  Patient reports that his mother sister and uncle all have type 2 diabetes and passed away as a result of it.  Patient was originally started on metformin for a few months, but following that his labs were all reassessed and he was told he did not need to be on anything and metformin was discontinued (this was approximately in 2019)  Of note patient's A1c in June 2024 was 16.5  A1c on 10/15/2024 is 7.5%  Patient reports that he began having diabetic retinopathy since ~7/24  He has seen a neurologist and a presumptive diagnosis is that his neuropathy is secondary to his diabetes despite his very recent diagnosis.  His prior weight to COVID was approximately 152 pounds, but he now weighs 95 pounds and his BMI is approximately 50.  He reports that this is secondary to him simply feeling less hungry.  Patient does have some diarrhea but denies vomiting.  He does report eating less.  He has been treated with gabapentin and he is doing somewhat better on that medication - he is followiung with neuro for his pain  He checks his sugars and reports that he does not get lows and his sugars are rarely in the 200s  10/2024 CHRISTINA ab <5.0, islet cell ab <1.0, A1c 7.8%  1/15/25 dexcom  0% very low, 0% low, 14% in range, 50% high, 36% very high; GMI 8.9; average glucose 235    Patient's sugars are consistently above 180 without  lows  Patient is very hesitant to consider insulin, as he has a fear of needles      ROS:  Per HPI, otherwise: Negative    Past Medical History:   Diagnosis Date    Pneumonia due to COVID-19 virus 12/8/2020     Social History     Tobacco Use    Smoking status: Never    Smokeless tobacco: Never   Vaping Use    Vaping status: Never Used   Substance Use Topics    Alcohol use: No    Drug use: No     Current Outpatient Medications   Medication Sig Dispense Refill    gabapentin (NEURONTIN) 300 MG Cap Take 3 Capsules by mouth 3 times a day. 270 Capsule 11    DULoxetine (CYMBALTA) 60 MG Cap DR Particles delayed-release capsule Take 1 Capsule by mouth every day. 90 Capsule 3    Continuous Glucose Sensor (DEXCOM G7 SENSOR) Misc 1 Each every 10 days for 180 days. 9 Each 1    Continuous Glucose  (DEXCOM G7 ) Device 1 Each Once Every 12 Months. 1 Each 0    metformin (GLUCOPHAGE) 1000 MG tablet Take 1 Tablet by mouth 2 times a day with meals. 60 Tablet 2    Blood Glucose Meter Kit Test blood sugar as recommended by provider.  Per insurance blood glucose monitoring kit. 1 Kit 0    Blood Glucose Test Strips Use one per insurance strip to test blood sugar once daily early morning before first meal. 100 Strip 0    Lancets Use one per insurance lancet to test blood sugar once daily early morning before first meal. 100 Each 0    Alcohol Swabs Wipe site with prep pad prior to injection. 100 Each 0    metformin (GLUCOPHAGE) 1000 MG tablet Take 1 Tablet by mouth 2 times a day with meals for 270 days. 180 Tablet 2    DULoxetine (CYMBALTA) 30 MG Cap DR Particles Take 1 Capsule by mouth every day. (Patient not taking: Reported on 2/18/2025) 30 Capsule 11    gabapentin (NEURONTIN) 300 MG Cap Take 3 Capsules by mouth 3 times a day. (Patient taking differently: Take 600 mg by mouth 3 times a day.) 180 Capsule 2    nortriptyline (PAMELOR) 50 MG capsule Take 1 Capsule by mouth every evening. (Patient not taking: Reported on  "2/18/2025) 30 Capsule 2    DULoxetine (CYMBALTA) 30 MG Cap DR Particles Take 1 Capsule by mouth every day. (Patient not taking: Reported on 2/18/2025) 30 Capsule 11    DULoxetine (CYMBALTA) 30 MG Cap DR Particles Take 1 Capsule by mouth every day. 30 Capsule 11    tizanidine (ZANAFLEX) 4 MG Tab Take 0.5-1 Tablets by mouth at bedtime as needed (muscle spasms). (Patient not taking: Reported on 2/18/2025) 30 Tablet 2    meloxicam (MOBIC) 15 MG tablet Take 1 Tablet by mouth every day. (Patient not taking: Reported on 9/10/2024) 30 Tablet 1    Insulin Syringes U-100 0.3 mL Use one syringe to inject insulin once daily. (Patient not taking: Reported on 6/15/2024) 100 Each 0     No current facility-administered medications for this visit.       BP (!) 145/80   Pulse 98   Ht 1.57 m (5' 1.81\")   Wt 47.2 kg (104 lb)   SpO2 99%   BMI 19.14 kg/m²     Physical Exam:   Gen:           Alert and oriented, No apparent distress. Mood and affect appropriate, normal interaction with examiner.   Hearing:     Grossly intact.  Nose:          Normal, no lesions or deformities.  Gait and Station: Uses cane secondary to neuropathy  Digits and Nails: No clubbing, cyanosis, petechiae, or nodes.   Skin:        No rashes, lesions or ulcers noted.               Ext:           No cyanosis or edema.    Assessment and Plan:     1. Type 2 diabetes mellitus with hyperglycemia, without long-term current use of insulin (HCC)  I recommended to the patient that he consider low-dose long-acting insulin  Patient is afraid of needles so he deferred at this time  Will try Rybelsus to see if we can start to bring his overall sugar levels down  Will also refer to dietitian  Follow-up in 3 to 4 months    - POCT Hemoglobin A1C    2. Neuropathic pain  Patient continues to follow with neuro for his neuropathy      Orders Placed This Encounter    POCT Hemoglobin A1C       No follow-ups on file.    Please note that this dictation was created using voice " recognition software. I have made every reasonable attempt to correct obvious errors, but I expect that there are errors of grammar and possibly content that I did not discover before finalizing the note.     Gabriel Payton M.D.

## 2025-02-24 NOTE — Clinical Note
REFERRAL APPROVAL NOTICE         Sent on February 24, 2025                   Singh Malik  [address] [facility] [place] [place] [address]                   Dear Mr. Jackie Malik,    After a careful review of the medical information and benefit coverage, [facility] has processed your referral. See below for additional details.    If applicable, you must be actively enrolled with your insurance for coverage of the authorized service. If you have any questions regarding your coverage, please contact your insurance directly.    REFERRAL INFORMATION   Referral #:  [id number]  Referred-To Department    Referred-By Provider:  Shabana Payton M.D.   Endocrinology [facility]      [address]  [place] [address]  [phone] [address]  [facility] [address] [phone]    Referral Start Date:  02/23/2025  Referral End Date:   02/23/2026           SCHEDULING  If you do not already have an appointment, please call [phone] to make an appointment.   MORE INFORMATION  As a reminder, [facility] ownership has changed, meaning this location is now owned and operated by [facility]. As such, we want to clarify that our patients should expect to receive two separate bills for the services received at [facility] - one representing the [facility] facility fees as the owner of the establishment, and the other to represent the physician's services and subsequent fees. You can speak with your insurance carrier for a pricing estimate by calling the customer service number on the back of your card and ask about charges for a hospital outpatient visit.  If you do not already have a [facility] account, sign up at: [email or web address]  You can access your medical information, make appointments, see lab results, billing information, and more.  If you have questions regarding this referral, please contact  the [facility] Referrals department at:              [phone]. Monday - Friday 7:30AM - 5:00PM.      Sincerely,  [facility]

## 2025-02-26 ENCOUNTER — TELEPHONE (OUTPATIENT)
Dept: NEUROLOGY | Facility: MEDICAL CENTER | Age: 60
End: 2025-02-26
Payer: COMMERCIAL

## 2025-02-26 NOTE — TELEPHONE ENCOUNTER
PT sister called to get the PT meds refilled he has ran out of meds gabapentin (NEURONTIN) 300 MG Cap & DULoxetine (CYMBALTA) 60 MG Cap. The PT would like a message from my chart when done   2/26/25   2:53 PM

## 2025-02-27 DIAGNOSIS — G62.9 NEUROPATHY: Primary | ICD-10-CM

## 2025-02-27 DIAGNOSIS — G62.9 NEUROPATHY: ICD-10-CM

## 2025-02-27 RX ORDER — GABAPENTIN 300 MG/1
900 CAPSULE ORAL 3 TIMES DAILY
Qty: 270 CAPSULE | Refills: 11 | Status: SHIPPED | OUTPATIENT
Start: 2025-02-27

## 2025-02-27 RX ORDER — GABAPENTIN 300 MG/1
900 CAPSULE ORAL 3 TIMES DAILY
Qty: 270 CAPSULE | Refills: 3 | Status: SHIPPED | OUTPATIENT
Start: 2025-02-27 | End: 2025-08-26

## 2025-02-27 RX ORDER — DULOXETIN HYDROCHLORIDE 30 MG/1
30 CAPSULE, DELAYED RELEASE ORAL DAILY
Qty: 30 CAPSULE | Refills: 11 | Status: SHIPPED | OUTPATIENT
Start: 2025-02-27

## 2025-02-27 RX ORDER — DULOXETIN HYDROCHLORIDE 30 MG/1
30 CAPSULE, DELAYED RELEASE ORAL DAILY
Qty: 90 CAPSULE | Refills: 3 | Status: SHIPPED | OUTPATIENT
Start: 2025-02-27

## 2025-02-27 NOTE — TELEPHONE ENCOUNTER
Pt. Sister called and stated he was out of these medications, please refill. Last appointment 1/23/2025. Next appointment 5/22/2025. Thank you!  Lina Ordonez, Med Ass't

## 2025-03-05 ENCOUNTER — TELEPHONE (OUTPATIENT)
Dept: ENDOCRINOLOGY | Facility: MEDICAL CENTER | Age: 60
End: 2025-03-05
Payer: COMMERCIAL

## 2025-03-05 DIAGNOSIS — E11.65 TYPE 2 DIABETES MELLITUS WITH HYPERGLYCEMIA, WITHOUT LONG-TERM CURRENT USE OF INSULIN (HCC): ICD-10-CM

## 2025-03-05 NOTE — TELEPHONE ENCOUNTER
Pt's sister called wanting to let Dr. Payton know that Rybelsus 3mg was going to cost over 1,000 dollars to .Please advise            Medication Requested: Metformin      Insulin pen Or vial? : NA        Days Supply: 90        Pharmacy: [facility]        Number of Refills: 5

## 2025-03-19 ENCOUNTER — APPOINTMENT (OUTPATIENT)
Dept: PHYSICAL MEDICINE AND REHAB | Facility: MEDICAL CENTER | Age: 60
End: 2025-03-19
Payer: COMMERCIAL

## 2025-03-19 VITALS
DIASTOLIC BLOOD PRESSURE: 96 MMHG | BODY MASS INDEX: 20.44 KG/M2 | HEIGHT: 61 IN | TEMPERATURE: 99.1 F | HEART RATE: 91 BPM | OXYGEN SATURATION: 99 % | SYSTOLIC BLOOD PRESSURE: 148 MMHG | WEIGHT: 108.25 LBS

## 2025-03-19 DIAGNOSIS — G89.29 OTHER CHRONIC PAIN: ICD-10-CM

## 2025-03-19 DIAGNOSIS — G62.9 NEUROPATHY: ICD-10-CM

## 2025-03-19 DIAGNOSIS — E11.42 DIABETIC POLYNEUROPATHY ASSOCIATED WITH TYPE 2 DIABETES MELLITUS (HCC): ICD-10-CM

## 2025-03-19 DIAGNOSIS — E11.65 TYPE 2 DIABETES MELLITUS WITH HYPERGLYCEMIA, WITHOUT LONG-TERM CURRENT USE OF INSULIN (HCC): ICD-10-CM

## 2025-03-19 DIAGNOSIS — R20.0 NUMBNESS AND TINGLING OF BOTH LEGS: ICD-10-CM

## 2025-03-19 DIAGNOSIS — G89.29 CHRONIC PAIN OF LEFT ANKLE: ICD-10-CM

## 2025-03-19 DIAGNOSIS — M43.17 ANTEROLISTHESIS OF LUMBOSACRAL SPINE: ICD-10-CM

## 2025-03-19 DIAGNOSIS — R20.2 NUMBNESS AND TINGLING OF BOTH LEGS: ICD-10-CM

## 2025-03-19 DIAGNOSIS — M79.2 NEUROPATHIC PAIN: ICD-10-CM

## 2025-03-19 DIAGNOSIS — M25.572 CHRONIC PAIN OF LEFT ANKLE: ICD-10-CM

## 2025-03-19 PROCEDURE — 99214 OFFICE O/P EST MOD 30 MIN: CPT | Performed by: STUDENT IN AN ORGANIZED HEALTH CARE EDUCATION/TRAINING PROGRAM

## 2025-03-19 PROCEDURE — 3080F DIAST BP >= 90 MM HG: CPT | Performed by: STUDENT IN AN ORGANIZED HEALTH CARE EDUCATION/TRAINING PROGRAM

## 2025-03-19 PROCEDURE — 3077F SYST BP >= 140 MM HG: CPT | Performed by: STUDENT IN AN ORGANIZED HEALTH CARE EDUCATION/TRAINING PROGRAM

## 2025-03-19 RX ORDER — DULOXETIN HYDROCHLORIDE 30 MG/1
60 CAPSULE, DELAYED RELEASE ORAL DAILY
Qty: 120 CAPSULE | Refills: 11 | Status: SHIPPED | OUTPATIENT
Start: 2025-03-19

## 2025-03-19 RX ORDER — GABAPENTIN 300 MG/1
900 CAPSULE ORAL 3 TIMES DAILY
Qty: 180 CAPSULE | Refills: 2 | Status: SHIPPED | OUTPATIENT
Start: 2025-03-19

## 2025-03-19 ASSESSMENT — FIBROSIS 4 INDEX: FIB4 SCORE: 0.68

## 2025-03-19 NOTE — Clinical Note
Andre Live,  I hope you are well.  I think Singh would benefit from an urgent appointment with you. He has an enlarging patchy red rash on his calves. This is new since he last saw you. No open wounds on exam today, no obvious swelling, and negative Homans today.  He is unsure why the rash started but he does not appear to have had vascular studies.   When I saw him last time, he had some pitting edema in his left calf which has now subsided.  In the meantime, I refilled his gabapentin and Cymbalta.  I discussed that I would like for him to trial the increased dose of Cymbalta, 60 mg.  I also discussed that we could do a spinal cord stimulator trial to target his neuropathy pain if he does not have adequate relief with medications.  Thanks, Rosa Ronquillo MD Interventional Pain and Spine Physical Medicine and Rehabilitation [facility]

## 2025-03-19 NOTE — PROGRESS NOTES
Verbal consent was acquired by the patient to use [employer] ambient listening note generation during this visit Yes      services were used in the patient's primary language of Yakut.     Name or Number: Nerissa  Mode of interpretation: iPad    Content of Interpretation:    Follow-up patient Note    Interventional Pain and Spine  Physiatry (Physical Medicine and Rehabilitation)     Patient Name: Singh Malik  : 1965  Date of service: 3/19/2025    Chief Complaint:   Chief Complaint   Patient presents with    Follow-Up     3m fv-Diabetic polyneuropathy       HISTORY  Please see new patient note by Dr. Ronquillo for more details.     HPI  Today's visit   Singh Malik ( 1965) is a male with Diagnoses of Diabetic polyneuropathy associated with type 2 diabetes mellitus (HCC), Numbness and tingling of both legs, Other chronic pain, Anterolisthesis of L5 on S1, Type 2 diabetes mellitus with hyperglycemia, without long-term current use of insulin (HCC), Neuropathic pain, Chronic pain of left ankle, and Neuropathy were pertinent to this visit.    He reports a 40 to 50 percent improvement in his foot and leg pain, attributing this to his current medication regimen of gabapentin 900 mg three times daily and duloxetine 30 mg. He has not yet tried the prescribed 60 mg dose of duloxetine due to pharmacy issues but believes that an increased dose would be more effective. He has experienced periods without medication due to prescription refill issues at the pharmacy. He has discontinued nortriptyline, which he found only partially effective, providing approximately 30 percent relief.     He continues to be bothered by the nerve pain in his feet. His symptoms tend to worsen towards the end of his workweek, particularly on  and , likely due to his 8-hour shifts.     He reports no current swelling or redness in his feet and does not believe  "these symptoms are side effects of gabapentin. He has lost weight since his last visit, which he attributes to a decreased appetite and a shortened lunch break at work. He reports that his veins appear to be \"exploding,\" a condition he believes is related to his diabetes. This issue began when he started wearing boots for work, causing significant swelling that prevented him from fitting into his boots. He also reports persistent numbness and weakness in his big toe. He has been advised to elevate his feet to alleviate pain, a strategy that has been effective for ankle pain. He describes a sensation of muscle contraction extending to his buttocks, which he initially mistook for itchiness.     He experiences numbness and a stabbing sensation in his legs, which he believes may be related to his boots.     He has requested assistance in scheduling an earlier appointment with his primary care physician, as he has been informed that the wait time is typically one year.         ROS:   Red Flags ROS:   Fever, Chills, Sweats: Denies  Involuntary Weight Loss: Denies  Bladder Incontinence: Denies  Bowel Incontinence: denies  Saddle Anesthesia: Denies    All other systems reviewed and negative.     PMHx:   Past Medical History:   Diagnosis Date    Pneumonia due to COVID-19 virus 12/8/2020       PSHx:   History reviewed. No pertinent surgical history.    Family Hx:   Family History   Problem Relation Age of Onset    Diabetes Mother     Cancer Mother         Melanoma    Stroke Father     Breast Cancer Sister     Heart Disease Neg Hx     Hyperlipidemia Neg Hx     Hypertension Neg Hx        Social Hx:  Social History     Socioeconomic History    Marital status: Single     Spouse name: Not on file    Number of children: Not on file    Years of education: Not on file    Highest education level: Not on file   Occupational History    Not on file   Tobacco Use    Smoking status: Never    Smokeless tobacco: Never   Vaping Use    Vaping " status: Never Used   Substance and Sexual Activity    Alcohol use: No    Drug use: No    Sexual activity: Not Currently   Other Topics Concern    Not on file   Social History Narrative    Not on file     Social Drivers of Health     Financial Resource Strain: Not on file   Food Insecurity: Not on file   Transportation Needs: Not on file   Physical Activity: Not on file   Stress: Not on file   Social Connections: Not on file   Intimate Partner Violence: Not on file   Housing Stability: Not on file       Allergies:  No Known Allergies    Medications: reviewed on epic.   Outpatient Medications Marked as Taking for the 3/19/25 encounter (Office Visit) with Rosa Ronquillo M.D.   Medication Sig Dispense Refill    DULoxetine (CYMBALTA) 30 MG Cap DR Particles Take 2 Capsules by mouth every day. 120 Capsule 11    gabapentin (NEURONTIN) 300 MG Cap Take 3 Capsules by mouth 3 times a day. 180 Capsule 2    Semaglutide (RYBELSUS) 3 MG Tab Take 3 mg by mouth every day. 30 Tablet 0    Semaglutide (RYBELSUS) 7 MG Tab Take 7 mg by mouth every day for 100 doses. 100 Tablet 0    metformin (GLUCOPHAGE) 1000 MG tablet Take 1 Tablet by mouth 2 times a day with meals for 270 days. 180 Tablet 2    Continuous Glucose Sensor (DEXCOM G7 SENSOR) Misc 1 Each every 10 days for 180 days. 9 Each 1    Continuous Glucose  (DEXCOM G7 ) Device 1 Each Once Every 12 Months. 1 Each 0    metformin (GLUCOPHAGE) 1000 MG tablet Take 1 Tablet by mouth 2 times a day with meals. 60 Tablet 2    Blood Glucose Meter Kit Test blood sugar as recommended by provider.  Per insurance blood glucose monitoring kit. 1 Kit 0    Blood Glucose Test Strips Use one per insurance strip to test blood sugar once daily early morning before first meal. 100 Strip 0    Lancets Use one per insurance lancet to test blood sugar once daily early morning before first meal. 100 Each 0    Alcohol Swabs Wipe site with prep pad prior to injection. 100 Each 0        Current  "Outpatient Medications on File Prior to Visit   Medication Sig Dispense Refill    Semaglutide (RYBELSUS) 3 MG Tab Take 3 mg by mouth every day. 30 Tablet 0    Semaglutide (RYBELSUS) 7 MG Tab Take 7 mg by mouth every day for 100 doses. 100 Tablet 0    metformin (GLUCOPHAGE) 1000 MG tablet Take 1 Tablet by mouth 2 times a day with meals for 270 days. 180 Tablet 2    Continuous Glucose Sensor (DEXCOM G7 SENSOR) Misc 1 Each every 10 days for 180 days. 9 Each 1    Continuous Glucose  (DEXCOM G7 ) Device 1 Each Once Every 12 Months. 1 Each 0    metformin (GLUCOPHAGE) 1000 MG tablet Take 1 Tablet by mouth 2 times a day with meals. 60 Tablet 2    Blood Glucose Meter Kit Test blood sugar as recommended by provider.  Per insurance blood glucose monitoring kit. 1 Kit 0    Blood Glucose Test Strips Use one per insurance strip to test blood sugar once daily early morning before first meal. 100 Strip 0    Lancets Use one per insurance lancet to test blood sugar once daily early morning before first meal. 100 Each 0    Alcohol Swabs Wipe site with prep pad prior to injection. 100 Each 0    tizanidine (ZANAFLEX) 4 MG Tab Take 0.5-1 Tablets by mouth at bedtime as needed (muscle spasms). (Patient not taking: Reported on 3/19/2025) 30 Tablet 2    meloxicam (MOBIC) 15 MG tablet Take 1 Tablet by mouth every day. (Patient not taking: Reported on 3/19/2025) 30 Tablet 1    Insulin Syringes U-100 0.3 mL Use one syringe to inject insulin once daily. (Patient not taking: Reported on 3/19/2025) 100 Each 0     No current facility-administered medications on file prior to visit.         EXAMINATION     Physical Exam:   BP (!) 148/96 (BP Location: Right arm, Patient Position: Sitting, BP Cuff Size: Adult)   Pulse 91   Temp 37.3 °C (99.1 °F) (Temporal)   Ht 1.549 m (5' 1\")   Wt 49.1 kg (108 lb 3.9 oz)   SpO2 99%     Constitutional:   Body Habitus: Body mass index is 20.45 kg/m².  Cooperation: Fully cooperates with " exam  Appearance: Well-groomed, well-nourished.    Eyes: No scleral icterus to suggest severe liver disease, no proptosis to suggest severe hyperthyroidism    ENT -no obvious auditory deficits, no noticeable facial droop     Skin -patchy redness in bilateral calves.  Does not appear to be classic for venous stasis appearance.  No pitting edema noted.  Negative Homans bilaterally    Respiratory-  breathing comfortably on room air, no audible wheezing    Cardiovascular-distal extremities warm and well perfused.     Gastrointestinal - no obvious abdominal masses, non-distended    Psychiatric- alert and oriented ×3. Normal affect.     Gait -stable gait     Musculoskeletal and Neuro -      Thoracic/Lumbar Spine/Sacral Spine/Hips   Inspection: No evidence of atrophy in bilateral lower extremities throughout      Key points for the international standards for neurological classification of spinal cord injury (ISNCSCI) to light touch.   Dermatome R L   L2 2 2   L3 2 2   L4 1 distal from knee 1 distal from knee   L5 1 distal from knee 1 distal from knee   S1 1 distal from knee 1 distal from knee   S2 2 2      Motor Exam Lower Extremities  ? Myotome R L   Hip flexion L2 4+ 4+   Knee extension L3 4+ 4+   Ankle dorsiflexion L4 5 5   Toe extension L5 5 5   Ankle plantarflexion S1 5 5       previous exam  There is limited active range of motion with lumbar extension    Facet loading maneuver positive on left, negative on right     Palpation:   Tenderness to palpation over the lumbar facets bilaterally spanning approximately L1-L5 levels. No tenderness to palpation at midline of lumbosacral spine.     Lumbar spine /hip provocative exam maneuvers  Straight leg raise negative bilaterally  FADIR test negative bilaterally     SI joint tests  LOREE test negative bilaterally      Reflexes  ?   R L   Patella   2+ 2+   Achilles    2+ 2+      Clonus of the ankle negative bilaterally              MEDICAL DECISION MAKING    Medical records  review: see under HPI section.     DATA    Labs: Personally reviewed at today's visit:     Lab Results   Component Value Date/Time    SODIUM 139 10/22/2024 11:34 AM    POTASSIUM 4.0 10/22/2024 11:34 AM    CHLORIDE 99 10/22/2024 11:34 AM    CO2 26 10/22/2024 11:34 AM    ANION 14.0 10/22/2024 11:34 AM    GLUCOSE 366 (H) 10/22/2024 11:34 AM    BUN 6 (L) 10/22/2024 11:34 AM    CREATININE 0.74 10/22/2024 11:34 AM    CALCIUM 10.4 10/22/2024 11:34 AM    ASTSGOT 16 10/22/2024 11:34 AM    ALTSGPT 18 10/22/2024 11:34 AM    TBILIRUBIN 0.3 10/22/2024 11:34 AM    ALBUMIN 4.39 11/11/2024 09:11 AM    TOTPROTEIN 7.0 11/11/2024 09:11 AM    GLOBULIN 2.9 10/22/2024 11:34 AM    AGRATIO 1.5 10/22/2024 11:34 AM       Lab Results   Component Value Date/Time    PROTHROMBTM 12.8 12/08/2020 03:32 PM    INR 0.94 12/08/2020 03:32 PM        Lab Results   Component Value Date/Time    WBC 8.2 07/19/2024 07:17 AM    RBC 5.02 07/19/2024 07:17 AM    HEMOGLOBIN 15.8 07/19/2024 07:17 AM    HEMATOCRIT 43.9 07/19/2024 07:17 AM    MCV 87.5 07/19/2024 07:17 AM    MCH 31.5 07/19/2024 07:17 AM    MCHC 36.0 07/19/2024 07:17 AM    MPV 8.9 (L) 07/19/2024 07:17 AM    NEUTSPOLYS 57.70 07/19/2024 07:17 AM    LYMPHOCYTES 29.40 07/19/2024 07:17 AM    MONOCYTES 9.00 07/19/2024 07:17 AM    EOSINOPHILS 2.80 07/19/2024 07:17 AM    BASOPHILS 0.90 07/19/2024 07:17 AM        Lab Results   Component Value Date/Time    HBA1C 7.6 (A) 02/18/2025 08:07 AM      Dr. Ho EMG Examination Date: 9/6/2024      Impression:       This is  an abnormal and a technically limited study due to patient tolerance with needle exam.    Findings suggestive of a length-dependent peripheral neuropathy of the lower extremities.    Unable to rule out radiculopathy or plexopathy.  Findings suggestive of right fibular neuropathy with significantly decreased amplitude compared to the left .   Less likely mononeuropathy of the left fibular and bilateral tibial nerves     Imaging:   I personally  reviewed following images, these are my reads   MRI lumbar spine 9/26/2024  No significant central canal stenosis or neuroforaminal stenosis. Grade 1 anterolisthesis of L5 on S1 with bilateral L5 pars defects. See formal radiology report for further details.      IMAGING radiology reads. I reviewed the following radiology reads     Results for orders placed during the hospital encounter of 08/10/10    MR-BRAIN-WITH & W/O    Results for orders placed during the hospital encounter of 12/17/13    MR-BRAIN-W/O    Impression  1.  Benign-appearing 12 mm raised lesion over the left parietal scalp with no significant change since 2010.  2.  Otherwise, unremarkable MRI of the brain with no significant change since 8/10/2010.        [facility]  [address] [facility] [address]                                                            Results for orders placed during the hospital encounter of 11/14/13    DX-CERVICAL SPINE-2 OR 3 VIEWS    Impression  Moderate C5-6 and C6-7 degenerative disc disease and slight probably chronic C4 anterolisthesis.            [facility] [address] [facility] [address]            Results for orders placed during the hospital encounter of 06/15/24    DX-FOOT-COMPLETE 3+ RIGHT    Impression  1.  Right mid foot and probably 1st metatarsophalangeal joint osteoarthritis    2.  Small vessel atherosclerotic plaque               Results for orders placed during the hospital encounter of 06/29/24    DX-LUMBAR SPINE-2 OR 3 VIEWS    Impression  1.  Transitional thoracolumbar and lumbosacral vertebrae.    2.  Grade 1 L5 anterolisthesis and bilateral L5 pars defects similar to previous.    3.  Multilevel degenerative disease mildly increased compared to previous.                         Diagnosis  Visit Diagnoses     ICD-10-CM   1. Diabetic polyneuropathy associated with type 2 diabetes mellitus (HCC)  E11.42   2. Numbness and tingling of both legs  R20.0    R20.2   3. Other chronic pain   G89.29   4. Anterolisthesis of L5 on S1  M43.17   5. Type 2 diabetes mellitus with hyperglycemia, without long-term current use of insulin (HCC)  E11.65   6. Neuropathic pain  M79.2   7. Chronic pain of left ankle  M25.572    G89.29   8. Neuropathy  G62.9                   ASSESSMENT AND PLAN:  Singh Malik (: 1965) is a male with      Singh was seen today for follow-up.    Diagnoses and all orders for this visit:    Diabetic polyneuropathy associated with type 2 diabetes mellitus (HCC)    Numbness and tingling of both legs    Other chronic pain    Anterolisthesis of L5 on S1    Type 2 diabetes mellitus with hyperglycemia, without long-term current use of insulin (HCC)    Neuropathic pain    Chronic pain of left ankle    Neuropathy  -     DULoxetine (CYMBALTA) 30 MG Cap DR Particles; Take 2 Capsules by mouth every day.    Other orders  -     gabapentin (NEURONTIN) 300 MG Cap; Take 3 Capsules by mouth 3 times a day.        PLAN  Diagnostic workup: Appreciate neurology care     Medications:   - agree with discontinuation of nortriptyline 50mg QHS given neuropathic pain interfering with sleep. Discussed anticholinergic SE.  - refill gabapentin 900mg TID.  The patient is currently noticing improvement in pain on this dose which is now stable.  -refill Cymbalta. Discussed that the patient should trial 60mg daily. He has only tried 30mg at this time.      Interventions:   - I have discussed that I do not think an epidural steroid injection would significantly improve his symptoms at this time, given that I do not see any significant nerve impingement on his MRI that would correlate with his symptoms.  - I discussed spinal cord stimulator to target diabetic peripheral neuropathy if medication adjustments are inadequate to control his pain.  Gave patient an informational brochure today.     Other  -Continue follow-up with endocrinology Dr. Gabriel Payton  - PCP re: patient's enlarging patchy red  rash on his calves. This is new since he last saw his PCP. No open wounds on exam today, no obvious swelling, and negative Homans today.  He is unsure why the rash started but he does not appear to have had vascular studies. Pitting edema in his left calf noted at his previous visit with me has now subsided    Follow-up: In 3 mos or sooner as needed    Orders Placed This Encounter    DULoxetine (CYMBALTA) 30 MG Cap DR Particles    gabapentin (NEURONTIN) 300 MG Cap       Rosa Ronquillo MD  Interventional Pain and Spine  Physical Medicine and Rehabilitation  [facility] the [facility]      The above note documents my personal evaluation of this patient. In addition, I have reviewed and confirmed with the patient and MA the supportive information documented in today's Patient Health Questionnaire and Office Note.     Please note that this dictation was created using voice recognition software. I have made every reasonable attempt to correct obvious errors, but I expect that there are errors of grammar and possibly content that I did not discover before finalizing the note.

## 2025-03-30 RX ORDER — ERTUGLIFLOZIN 5 MG/1
5 TABLET, FILM COATED ORAL DAILY
Qty: 100 TABLET | Refills: 1 | Status: SHIPPED | OUTPATIENT
Start: 2025-03-30 | End: 2025-10-16

## 2025-03-31 ENCOUNTER — OFFICE VISIT (OUTPATIENT)
Dept: MEDICAL GROUP | Facility: PHYSICIAN GROUP | Age: 60
End: 2025-03-31
Payer: COMMERCIAL

## 2025-03-31 VITALS
HEART RATE: 99 BPM | BODY MASS INDEX: 20.54 KG/M2 | RESPIRATION RATE: 12 BRPM | WEIGHT: 108.8 LBS | HEIGHT: 61 IN | SYSTOLIC BLOOD PRESSURE: 112 MMHG | DIASTOLIC BLOOD PRESSURE: 60 MMHG | OXYGEN SATURATION: 97 % | TEMPERATURE: 99.7 F

## 2025-03-31 DIAGNOSIS — M79.605 PAIN IN BOTH LOWER EXTREMITIES: ICD-10-CM

## 2025-03-31 DIAGNOSIS — R21 RASH: ICD-10-CM

## 2025-03-31 DIAGNOSIS — L81.9 DISCOLORATION OF SKIN OF LOWER LEG: ICD-10-CM

## 2025-03-31 DIAGNOSIS — M79.604 PAIN IN BOTH LOWER EXTREMITIES: ICD-10-CM

## 2025-03-31 ASSESSMENT — FIBROSIS 4 INDEX: FIB4 SCORE: 0.68

## 2025-03-31 NOTE — TELEPHONE ENCOUNTER
We will try steglatro instead - it isn't as strong, and if he feels ill after he takes it he should stop it and contact us, but it can still help his diabetes

## 2025-03-31 NOTE — PROGRESS NOTES
"Subjective:     Chief Complaint   Patient presents with    Leg Pain     Varicose veins bilateral and itching in back      Interpretor Name: Jamie  Interpretor ID: [id number]  Patient preferred language used: Czech    History of Present Illness  The patient is a 59-year-old male here to follow up on some varicose veins bilaterally as well as a rash to his legs.     He reports experiencing pain in his legs, which he describes as a sensation akin to his arteries \"bursting\". He attributes this discomfort to varicose veins. The onset of these symptoms was marked by ankle pain, followed by significant swelling in his calf, to the extent that he was unable to perceive any foot pain. He also notes numbness in his big toe. These symptoms have been present for approximately 3 to 4 months. Initially, he suspected ill-fitting boots as the cause, but now believes it may be related to his diabetes, as he does not experience these symptoms when wearing tight shoes. He has been managing his neuropathy with medication and has attempted to improve circulation by elevating his legs against a wall. However, he is uncertain if these measures or the neuropathy itself are contributing to the inflammation.    He has observed a rash on his legs, which he has not treated with any topical applications. The rash is non-pruritic and non-painful, but he admits to scratching it. He has been using cortisone cream on his back for the past 3 to 4 years, but finds it difficult to apply due to its location. Despite daily showers and using Dove soap, the rash persists. He has been using cortisone cream on his back for the past 3 to 4 years.    Allergies: Patient has no known allergies.  ROS per HPI  Health Maintenance: Deferred   Objective:     /60 (BP Location: Right arm, Patient Position: Sitting, BP Cuff Size: Adult)   Pulse 99   Temp 37.6 °C (99.7 °F) (Temporal)   Resp 12   Ht 1.549 m (5' 1\")   Wt 49.4 kg (108 lb 12.8 oz)   SpO2 97%   " BMI 20.56 kg/m²  Body mass index is 20.56 kg/m².     Physical Exam  Vitals reviewed.   Constitutional:       General: He is not in acute distress.     Appearance: Normal appearance. He is not ill-appearing.   Cardiovascular:      Rate and Rhythm: Normal rate and regular rhythm.      Comments: No Swelling or erythema present.  Pulmonary:      Effort: Pulmonary effort is normal. No respiratory distress.   Musculoskeletal:      Right lower leg: No edema.      Left lower leg: No edema.   Skin:     Coloration: Skin is not jaundiced or pale.      Findings: Rash present. Rash is crusting, macular and scaling.             Comments: Macular rash and urticaria present to lower extremities bilaterally   Neurological:      General: No focal deficit present.      Mental Status: He is alert and oriented to person, place, and time.   Psychiatric:         Mood and Affect: Mood normal.         Behavior: Behavior normal.         Thought Content: Thought content normal.         Judgment: Judgment normal.         Assessment and Plan:     The following treatment plan was discussed through shared decision making with the patient:    1. Pain in both lower extremities  US-EXTREMITY ARTERY LOWER BILAT      2. Discoloration of skin of lower leg  US-EXTREMITY ARTERY LOWER BILAT      3. Rash  US-EXTREMITY ARTERY LOWER BILAT        Assessment & Plan  The patient's symptoms suggest potential arterial or venous insufficiency, which could be contributing to the observed skin changes. An ultrasound study will be conducted to evaluate the blood flow in his lower extremities.    The rash may be a manifestation of his varicose veins, which can occasionally induce itching and discomfort. He is advised to apply over-the-counter hydrocortisone cream 10 percent to alleviate the rash. Additionally, the use of Aquaphor as a moisturizer and protective barrier is recommended. He is also encouraged to apply thick creams such as Eucerin, CeraVe, or Cetaphil to  moisturize his legs and reduce itching. For his back, he should continue using the steroid cream and consider using a self-applicator device for easier application. It is also suggested that he limit the use of soap on his back and ensure adequate hydration by drinking plenty of water and applying lotion post-shower.    Return in about 4 weeks (around 4/28/2025).       Please note that this note was created using dictation with voice recognition software. I have made every reasonable attempt to correct obvious errors, but I expect that there are errors of grammar and possibly content that I did not discover before finalizing the note.    MAIA Kyle  [facility]  [facility]